# Patient Record
Sex: MALE | Race: BLACK OR AFRICAN AMERICAN | Employment: FULL TIME | URBAN - METROPOLITAN AREA
[De-identification: names, ages, dates, MRNs, and addresses within clinical notes are randomized per-mention and may not be internally consistent; named-entity substitution may affect disease eponyms.]

---

## 2017-02-27 ENCOUNTER — APPOINTMENT (OUTPATIENT)
Dept: OCCUPATIONAL MEDICINE | Age: 27
End: 2017-02-27
Attending: EMERGENCY MEDICINE
Payer: COMMERCIAL

## 2017-03-24 ENCOUNTER — HOSPITAL ENCOUNTER (EMERGENCY)
Facility: HOSPITAL | Age: 27
Discharge: HOME | End: 2017-03-24
Attending: EMERGENCY MEDICINE
Payer: MEDICAID

## 2017-03-24 VITALS
SYSTOLIC BLOOD PRESSURE: 132 MMHG | HEART RATE: 66 BPM | DIASTOLIC BLOOD PRESSURE: 72 MMHG | BODY MASS INDEX: 25.67 KG/M2 | TEMPERATURE: 99 F | WEIGHT: 200 LBS | HEIGHT: 74 IN | OXYGEN SATURATION: 98 % | RESPIRATION RATE: 18 BRPM

## 2017-03-24 DIAGNOSIS — H66.91 RIGHT OTITIS MEDIA, UNSPECIFIED CHRONICITY, UNSPECIFIED OTITIS MEDIA TYPE: Primary | ICD-10-CM

## 2017-03-24 PROCEDURE — 99283 EMERGENCY DEPT VISIT LOW MDM: CPT

## 2017-03-24 RX ORDER — ONDANSETRON 4 MG/1
4 TABLET, ORALLY DISINTEGRATING ORAL EVERY 8 HOURS PRN
Qty: 9 TABLET | Refills: 0 | Status: SHIPPED | OUTPATIENT
Start: 2017-03-24 | End: 2017-03-27

## 2017-03-24 RX ORDER — AMOXICILLIN 875 MG/1
875 TABLET, COATED ORAL 2 TIMES DAILY
Qty: 20 TABLET | Refills: 0 | Status: SHIPPED | OUTPATIENT
Start: 2017-03-24 | End: 2017-04-03

## 2017-03-25 NOTE — ED NOTES
Pt presents with R ear pain beginning yesterday. Pain is \"8/10\". Denies nasal congestion, cough, sore throat. States he has had a fever on and off for past two days. Pt took tylenol last night but nothing since.  Pt has had n/v today since this AM. Denies

## 2017-03-25 NOTE — ED PROVIDER NOTES
Patient Seen in: 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 Right TM intact though erythematous/bulging and dull; right EAC/mastoid unremarkable. Left TM/EAC unremarkable. BL tonsils without exudate/erythema  Head: Normocephalic. Eyes: No injection. Cardiovascular: RRR.    Pulmonary/Chest: Effort n

## 2017-05-12 ENCOUNTER — APPOINTMENT (OUTPATIENT)
Dept: ULTRASOUND IMAGING | Facility: HOSPITAL | Age: 27
End: 2017-05-12
Attending: STUDENT IN AN ORGANIZED HEALTH CARE EDUCATION/TRAINING PROGRAM
Payer: MEDICAID

## 2017-05-12 ENCOUNTER — HOSPITAL ENCOUNTER (EMERGENCY)
Facility: HOSPITAL | Age: 27
Discharge: HOME | End: 2017-05-12
Attending: STUDENT IN AN ORGANIZED HEALTH CARE EDUCATION/TRAINING PROGRAM
Payer: MEDICAID

## 2017-05-12 ENCOUNTER — APPOINTMENT (OUTPATIENT)
Dept: CT IMAGING | Facility: HOSPITAL | Age: 27
End: 2017-05-12
Attending: STUDENT IN AN ORGANIZED HEALTH CARE EDUCATION/TRAINING PROGRAM
Payer: MEDICAID

## 2017-05-12 ENCOUNTER — APPOINTMENT (OUTPATIENT)
Dept: GENERAL RADIOLOGY | Facility: HOSPITAL | Age: 27
End: 2017-05-12
Attending: STUDENT IN AN ORGANIZED HEALTH CARE EDUCATION/TRAINING PROGRAM
Payer: MEDICAID

## 2017-05-12 VITALS
TEMPERATURE: 98 F | OXYGEN SATURATION: 98 % | RESPIRATION RATE: 18 BRPM | WEIGHT: 238 LBS | HEART RATE: 68 BPM | BODY MASS INDEX: 31 KG/M2 | SYSTOLIC BLOOD PRESSURE: 101 MMHG | DIASTOLIC BLOOD PRESSURE: 72 MMHG

## 2017-05-12 DIAGNOSIS — R07.9 ACUTE CHEST PAIN: Primary | ICD-10-CM

## 2017-05-12 DIAGNOSIS — M79.604 RIGHT LEG PAIN: ICD-10-CM

## 2017-05-12 DIAGNOSIS — R11.2 NAUSEA AND VOMITING IN ADULT: ICD-10-CM

## 2017-05-12 PROCEDURE — 99285 EMERGENCY DEPT VISIT HI MDM: CPT

## 2017-05-12 PROCEDURE — 96375 TX/PRO/DX INJ NEW DRUG ADDON: CPT

## 2017-05-12 PROCEDURE — 96361 HYDRATE IV INFUSION ADD-ON: CPT

## 2017-05-12 PROCEDURE — 93005 ELECTROCARDIOGRAM TRACING: CPT

## 2017-05-12 PROCEDURE — 93971 EXTREMITY STUDY: CPT | Performed by: STUDENT IN AN ORGANIZED HEALTH CARE EDUCATION/TRAINING PROGRAM

## 2017-05-12 PROCEDURE — 93010 ELECTROCARDIOGRAM REPORT: CPT

## 2017-05-12 PROCEDURE — 71010 XR CHEST AP PORTABLE  (CPT=71010): CPT | Performed by: STUDENT IN AN ORGANIZED HEALTH CARE EDUCATION/TRAINING PROGRAM

## 2017-05-12 PROCEDURE — 71275 CT ANGIOGRAPHY CHEST: CPT | Performed by: STUDENT IN AN ORGANIZED HEALTH CARE EDUCATION/TRAINING PROGRAM

## 2017-05-12 PROCEDURE — 96374 THER/PROPH/DIAG INJ IV PUSH: CPT

## 2017-05-12 PROCEDURE — 85610 PROTHROMBIN TIME: CPT | Performed by: STUDENT IN AN ORGANIZED HEALTH CARE EDUCATION/TRAINING PROGRAM

## 2017-05-12 PROCEDURE — 85730 THROMBOPLASTIN TIME PARTIAL: CPT | Performed by: STUDENT IN AN ORGANIZED HEALTH CARE EDUCATION/TRAINING PROGRAM

## 2017-05-12 PROCEDURE — 80053 COMPREHEN METABOLIC PANEL: CPT | Performed by: STUDENT IN AN ORGANIZED HEALTH CARE EDUCATION/TRAINING PROGRAM

## 2017-05-12 PROCEDURE — 74177 CT ABD & PELVIS W/CONTRAST: CPT | Performed by: STUDENT IN AN ORGANIZED HEALTH CARE EDUCATION/TRAINING PROGRAM

## 2017-05-12 PROCEDURE — 84484 ASSAY OF TROPONIN QUANT: CPT | Performed by: STUDENT IN AN ORGANIZED HEALTH CARE EDUCATION/TRAINING PROGRAM

## 2017-05-12 PROCEDURE — 85025 COMPLETE CBC W/AUTO DIFF WBC: CPT | Performed by: STUDENT IN AN ORGANIZED HEALTH CARE EDUCATION/TRAINING PROGRAM

## 2017-05-12 RX ORDER — FAMOTIDINE 20 MG/1
20 TABLET ORAL 2 TIMES DAILY PRN
Qty: 30 TABLET | Refills: 0 | Status: SHIPPED | OUTPATIENT
Start: 2017-05-12 | End: 2017-06-11

## 2017-05-12 RX ORDER — ONDANSETRON 2 MG/ML
4 INJECTION INTRAMUSCULAR; INTRAVENOUS ONCE
Status: COMPLETED | OUTPATIENT
Start: 2017-05-12 | End: 2017-05-12

## 2017-05-12 RX ORDER — ONDANSETRON 4 MG/1
4 TABLET, ORALLY DISINTEGRATING ORAL EVERY 8 HOURS PRN
Qty: 10 TABLET | Refills: 0 | Status: SHIPPED | OUTPATIENT
Start: 2017-05-12 | End: 2017-05-19

## 2017-05-12 RX ORDER — HYDROCODONE BITARTRATE AND ACETAMINOPHEN 5; 325 MG/1; MG/1
1-2 TABLET ORAL EVERY 4 HOURS PRN
Qty: 20 TABLET | Refills: 0 | Status: SHIPPED | OUTPATIENT
Start: 2017-05-12 | End: 2017-05-19

## 2017-05-12 RX ORDER — MORPHINE SULFATE 4 MG/ML
4 INJECTION, SOLUTION INTRAMUSCULAR; INTRAVENOUS ONCE
Status: COMPLETED | OUTPATIENT
Start: 2017-05-12 | End: 2017-05-12

## 2017-05-12 NOTE — ED PROVIDER NOTES
Patient Seen in: [facility] Emergency Department    History   Patient presents with:  Chest Pain Angina (cardiovascular)    Stated Complaint: chest pain, right leg pain    HPI    Patient is a 20-year-old male with previous history of sarcoma status 0430 Temporal   SpO2 05/12/17 0430 98 %   O2 Device 05/12/17 0430 None (Room air)       Current:/79 mmHg  Pulse 62  Temp(Src) 98.3 °F (36.8 °C) (Temporal)  Resp 16  Wt 107. 956 kg  SpO2 98%        Physical Exam    Constitutional: oriented to person, p ------                     CBC W/ DIFFERENTIAL[[id number]]          Abnormal            Final result                 Please view results for these tests on the individual orders. SCAN SLIDE      EKG    Rate, intervals and axes as noted on EKG Report.   Rat

## 2017-05-12 NOTE — ED PROVIDER NOTES
Patient updated with the status of his radiology studies. He is very tired as he was up all night and he is sleeping in the emergency department. Abdomen soft and nontender.

## 2017-05-12 NOTE — ED INITIAL ASSESSMENT (HPI)
Pt c/o midsternal chest pain radiating to right arm and down right leg; onset yesterday at noon. Denies n/v/d; reports headache yesterday that has since resolved.

## 2018-07-06 ENCOUNTER — TELEPHONE (OUTPATIENT)
Dept: SURGERY | Facility: CLINIC | Age: 28
End: 2018-07-06

## 2018-07-06 ENCOUNTER — HOSPITAL ENCOUNTER (EMERGENCY)
Facility: HOSPITAL | Age: 28
Discharge: HOME | End: 2018-07-06
Attending: EMERGENCY MEDICINE
Payer: COMMERCIAL

## 2018-07-06 VITALS
HEART RATE: 54 BPM | RESPIRATION RATE: 14 BRPM | SYSTOLIC BLOOD PRESSURE: 114 MMHG | DIASTOLIC BLOOD PRESSURE: 81 MMHG | HEIGHT: 74 IN | OXYGEN SATURATION: 99 % | TEMPERATURE: 98 F | BODY MASS INDEX: 25.67 KG/M2 | WEIGHT: 200 LBS

## 2018-07-06 DIAGNOSIS — G89.29 OTHER CHRONIC PAIN: Primary | ICD-10-CM

## 2018-07-06 LAB
ALBUMIN SERPL-MCNC: 3.8 G/DL (ref 3.5–4.8)
ALP LIVER SERPL-CCNC: 66 U/L (ref 45–117)
ALT SERPL-CCNC: 34 U/L (ref 17–63)
AST SERPL-CCNC: 21 U/L (ref 15–41)
BASOPHILS # BLD AUTO: 0.03 X10(3) UL (ref 0–0.1)
BASOPHILS NFR BLD AUTO: 0.6 %
BILIRUB SERPL-MCNC: 0.6 MG/DL (ref 0.1–2)
BUN BLD-MCNC: 10 MG/DL (ref 8–20)
CALCIUM BLD-MCNC: 9.1 MG/DL (ref 8.3–10.3)
CHLORIDE: 108 MMOL/L (ref 101–111)
CO2: 28 MMOL/L (ref 22–32)
CREAT BLD-MCNC: 0.97 MG/DL (ref 0.7–1.3)
EOSINOPHIL # BLD AUTO: 0.09 X10(3) UL (ref 0–0.3)
EOSINOPHIL NFR BLD AUTO: 1.9 %
ERYTHROCYTE [DISTWIDTH] IN BLOOD BY AUTOMATED COUNT: 12 % (ref 11.5–16)
GLUCOSE BLD-MCNC: 96 MG/DL (ref 70–99)
HCT VFR BLD AUTO: 46.3 % (ref 37–53)
HGB BLD-MCNC: 15.5 G/DL (ref 13–17)
IMMATURE GRANULOCYTE COUNT: 0.01 X10(3) UL (ref 0–1)
IMMATURE GRANULOCYTE RATIO %: 0.2 %
LIPASE: 159 U/L (ref 73–393)
LYMPHOCYTES # BLD AUTO: 1.47 X10(3) UL (ref 0.9–4)
LYMPHOCYTES NFR BLD AUTO: 31.8 %
M PROTEIN MFR SERPL ELPH: 7.3 G/DL (ref 6.1–8.3)
MCH RBC QN AUTO: 29.8 PG (ref 27–33.2)
MCHC RBC AUTO-ENTMCNC: 33.5 G/DL (ref 31–37)
MCV RBC AUTO: 88.9 FL (ref 80–99)
MONOCYTES # BLD AUTO: 0.55 X10(3) UL (ref 0.1–1)
MONOCYTES NFR BLD AUTO: 11.9 %
NEUTROPHIL ABS PRELIM: 2.47 X10 (3) UL (ref 1.3–6.7)
NEUTROPHILS # BLD AUTO: 2.47 X10(3) UL (ref 1.3–6.7)
NEUTROPHILS NFR BLD AUTO: 53.6 %
PLATELET # BLD AUTO: 318 10(3)UL (ref 150–450)
POTASSIUM SERPL-SCNC: 4.3 MMOL/L (ref 3.6–5.1)
RBC # BLD AUTO: 5.21 X10(6)UL (ref 4.3–5.7)
RED CELL DISTRIBUTION WIDTH-SD: 39.4 FL (ref 35.1–46.3)
SODIUM SERPL-SCNC: 140 MMOL/L (ref 136–144)
WBC # BLD AUTO: 4.6 X10(3) UL (ref 4–13)

## 2018-07-06 PROCEDURE — 99284 EMERGENCY DEPT VISIT MOD MDM: CPT

## 2018-07-06 PROCEDURE — 83690 ASSAY OF LIPASE: CPT | Performed by: EMERGENCY MEDICINE

## 2018-07-06 PROCEDURE — 80053 COMPREHEN METABOLIC PANEL: CPT | Performed by: EMERGENCY MEDICINE

## 2018-07-06 PROCEDURE — 96374 THER/PROPH/DIAG INJ IV PUSH: CPT

## 2018-07-06 PROCEDURE — 85025 COMPLETE CBC W/AUTO DIFF WBC: CPT | Performed by: EMERGENCY MEDICINE

## 2018-07-06 RX ORDER — TRAMADOL HYDROCHLORIDE 50 MG/1
50 TABLET ORAL EVERY 6 HOURS PRN
Qty: 20 TABLET | Refills: 0 | Status: SHIPPED | OUTPATIENT
Start: 2018-07-06 | End: 2018-07-13

## 2018-07-06 RX ORDER — DICYCLOMINE HCL 20 MG
20 TABLET ORAL 3 TIMES DAILY
Qty: 20 TABLET | Refills: 0 | Status: SHIPPED | OUTPATIENT
Start: 2018-07-06 | End: 2019-04-19

## 2018-07-06 RX ORDER — KETOROLAC TROMETHAMINE 30 MG/ML
15 INJECTION, SOLUTION INTRAMUSCULAR; INTRAVENOUS ONCE
Status: COMPLETED | OUTPATIENT
Start: 2018-07-06 | End: 2018-07-06

## 2018-07-06 NOTE — ED INITIAL ASSESSMENT (HPI)
RUQ pain since yesterday. Has had this pain intermittent x 2 years since having sarcoma removed and radiation therapy. Has had multiple work ups including CT's without a definitive diagnosis.

## 2018-07-06 NOTE — TELEPHONE ENCOUNTER
Patient called c/o continued right sided pain, chronic cough that has worsened and caused him to vomit yesterday. State he feels a lump on his right side under chest area above stomach, pain radiates from abdomen down his right side.  Patient states that he

## 2018-07-07 NOTE — ED PROVIDER NOTES
Patient Seen in: [facility] Emergency Department    History   Patient presents with:  Abdomen/Flank Pain (GI/)    Stated Complaint: right sided abdominal pain    HPI    Patient is a 58-year-old male comes emergency room for evaluation of right-mila reviewed and negative except as noted above.     Physical Exam   ED Triage Vitals  BP: 133/86 [07/06/18 1407]  Pulse: 68 [07/06/18 1407]  Resp: 18 [07/06/18 1407]  Temp: 98.2 °F (36.8 °C) [07/06/18 1407]  Temp src: Temporal [07/06/18 1407]  SpO2: 98 % [07/0 RAINBOW DRAW LAVENDER   RAINBOW DRAW LIGHT GREEN   RAINBOW DRAW GOLD   CBC W/ DIFFERENTIAL       ED Course as of Jul 06 2316  ------------------------------------------------------------      MDM     Patient is a 58-year-old male comes emergency room for for Pain., Print Script, Disp-20 tablet, R-0    Dicyclomine HCl 20 MG Oral Tab  Take 1 tablet (20 mg total) by mouth 3 (three) times daily. , Print Script, Disp-20 tablet, R-0

## 2018-07-09 ENCOUNTER — TELEPHONE (OUTPATIENT)
Dept: SURGERY | Facility: CLINIC | Age: 28
End: 2018-07-09

## 2018-07-09 DIAGNOSIS — C47.9 MALIGNANT PERIPHERAL NERVE SHEATH TUMOR (HCC): Primary | ICD-10-CM

## 2018-07-09 NOTE — TELEPHONE ENCOUNTER
Informed patient that Dr. Robbin Farah would like patient to obtain new imaging and then follow up with an OV. Orders for CAP ct entered in EPIC.

## 2018-07-30 ENCOUNTER — TELEPHONE (OUTPATIENT)
Dept: SURGERY | Facility: CLINIC | Age: 28
End: 2018-07-30

## 2018-07-30 NOTE — TELEPHONE ENCOUNTER
Multiple attempts to reach patient. Appointment for 8/1/18 cancelled as patient did not complete imaging as ordered. Callback number left for patient to reschedule after imaging complete.

## 2019-01-04 ENCOUNTER — HOSPITAL ENCOUNTER (EMERGENCY)
Age: 29
Discharge: HOME | End: 2019-01-04
Attending: EMERGENCY MEDICINE
Payer: COMMERCIAL

## 2019-01-04 ENCOUNTER — APPOINTMENT (OUTPATIENT)
Dept: CT IMAGING | Age: 29
End: 2019-01-04
Attending: EMERGENCY MEDICINE
Payer: COMMERCIAL

## 2019-01-04 VITALS
RESPIRATION RATE: 16 BRPM | HEART RATE: 84 BPM | OXYGEN SATURATION: 99 % | SYSTOLIC BLOOD PRESSURE: 123 MMHG | TEMPERATURE: 99 F | HEIGHT: 74 IN | DIASTOLIC BLOOD PRESSURE: 78 MMHG | WEIGHT: 260 LBS | BODY MASS INDEX: 33.37 KG/M2

## 2019-01-04 DIAGNOSIS — R11.2 NON-INTRACTABLE VOMITING WITH NAUSEA, UNSPECIFIED VOMITING TYPE: ICD-10-CM

## 2019-01-04 DIAGNOSIS — N10 ACUTE PYELONEPHRITIS: Primary | ICD-10-CM

## 2019-01-04 LAB
ALBUMIN SERPL-MCNC: 3.6 G/DL (ref 3.1–4.5)
ALBUMIN/GLOB SERPL: 1.1 {RATIO} (ref 1–2)
ALP LIVER SERPL-CCNC: 72 U/L (ref 45–117)
ALT SERPL-CCNC: 43 U/L (ref 17–63)
ANION GAP SERPL CALC-SCNC: 7 MMOL/L (ref 0–18)
AST SERPL-CCNC: 25 U/L (ref 15–41)
BASOPHILS # BLD AUTO: 0.01 X10(3) UL (ref 0–0.1)
BASOPHILS NFR BLD AUTO: 0.1 %
BILIRUB SERPL-MCNC: 0.8 MG/DL (ref 0.1–2)
BUN BLD-MCNC: 9 MG/DL (ref 8–20)
BUN/CREAT SERPL: 9.7 (ref 10–20)
CALCIUM BLD-MCNC: 8.6 MG/DL (ref 8.3–10.3)
CHLORIDE SERPL-SCNC: 104 MMOL/L (ref 101–111)
CO2 SERPL-SCNC: 27 MMOL/L (ref 22–32)
COLOR UR AUTO: YELLOW
CREAT BLD-MCNC: 0.93 MG/DL (ref 0.7–1.3)
EOSINOPHIL # BLD AUTO: 0 X10(3) UL (ref 0–0.3)
EOSINOPHIL NFR BLD AUTO: 0 %
ERYTHROCYTE [DISTWIDTH] IN BLOOD BY AUTOMATED COUNT: 12.1 % (ref 11.5–16)
GLOBULIN PLAS-MCNC: 3.4 G/DL (ref 2.8–4.4)
GLUCOSE BLD-MCNC: 97 MG/DL (ref 70–99)
GLUCOSE UR STRIP.AUTO-MCNC: NEGATIVE MG/DL
HCT VFR BLD AUTO: 46 % (ref 37–53)
HGB BLD-MCNC: 15.2 G/DL (ref 13–17)
IMMATURE GRANULOCYTE COUNT: 0.03 X10(3) UL (ref 0–1)
IMMATURE GRANULOCYTE RATIO %: 0.4 %
LIPASE: 98 U/L (ref 73–393)
LYMPHOCYTES # BLD AUTO: 0.51 X10(3) UL (ref 0.9–4)
LYMPHOCYTES NFR BLD AUTO: 6.7 %
M PROTEIN MFR SERPL ELPH: 7 G/DL (ref 6.4–8.2)
MCH RBC QN AUTO: 29.4 PG (ref 27–33.2)
MCHC RBC AUTO-ENTMCNC: 33 G/DL (ref 31–37)
MCV RBC AUTO: 89 FL (ref 80–99)
MONOCYTES # BLD AUTO: 0.92 X10(3) UL (ref 0.1–1)
MONOCYTES NFR BLD AUTO: 12 %
NEUTROPHIL ABS PRELIM: 6.17 X10 (3) UL (ref 1.3–6.7)
NEUTROPHILS # BLD AUTO: 6.17 X10(3) UL (ref 1.3–6.7)
NEUTROPHILS NFR BLD AUTO: 80.8 %
NITRITE UR QL STRIP.AUTO: NEGATIVE
OSMOLALITY SERPL CALC.SUM OF ELEC: 285 MOSM/KG (ref 275–295)
PH UR STRIP.AUTO: 6 [PH] (ref 4.5–8)
PLATELET # BLD AUTO: 268 10(3)UL (ref 150–450)
POTASSIUM SERPL-SCNC: 3.6 MMOL/L (ref 3.6–5.1)
RBC # BLD AUTO: 5.17 X10(6)UL (ref 4.3–5.7)
RED CELL DISTRIBUTION WIDTH-SD: 39.7 FL (ref 35.1–46.3)
SODIUM SERPL-SCNC: 138 MMOL/L (ref 136–144)
SP GR UR STRIP.AUTO: 1.02 (ref 1–1.03)
UROBILINOGEN UR STRIP.AUTO-MCNC: 1 MG/DL
WBC # BLD AUTO: 7.6 X10(3) UL (ref 4–13)

## 2019-01-04 PROCEDURE — 85025 COMPLETE CBC W/AUTO DIFF WBC: CPT | Performed by: EMERGENCY MEDICINE

## 2019-01-04 PROCEDURE — 74177 CT ABD & PELVIS W/CONTRAST: CPT | Performed by: EMERGENCY MEDICINE

## 2019-01-04 PROCEDURE — 81015 MICROSCOPIC EXAM OF URINE: CPT | Performed by: EMERGENCY MEDICINE

## 2019-01-04 PROCEDURE — 87086 URINE CULTURE/COLONY COUNT: CPT | Performed by: EMERGENCY MEDICINE

## 2019-01-04 PROCEDURE — 81001 URINALYSIS AUTO W/SCOPE: CPT | Performed by: EMERGENCY MEDICINE

## 2019-01-04 PROCEDURE — 83690 ASSAY OF LIPASE: CPT | Performed by: EMERGENCY MEDICINE

## 2019-01-04 PROCEDURE — 99284 EMERGENCY DEPT VISIT MOD MDM: CPT

## 2019-01-04 PROCEDURE — 80053 COMPREHEN METABOLIC PANEL: CPT | Performed by: EMERGENCY MEDICINE

## 2019-01-04 PROCEDURE — 96361 HYDRATE IV INFUSION ADD-ON: CPT

## 2019-01-04 PROCEDURE — 96374 THER/PROPH/DIAG INJ IV PUSH: CPT

## 2019-01-04 RX ORDER — ONDANSETRON 4 MG/1
4 TABLET, ORALLY DISINTEGRATING ORAL EVERY 4 HOURS PRN
Qty: 10 TABLET | Refills: 0 | Status: SHIPPED | OUTPATIENT
Start: 2019-01-04 | End: 2019-01-11

## 2019-01-04 RX ORDER — ONDANSETRON 2 MG/ML
4 INJECTION INTRAMUSCULAR; INTRAVENOUS ONCE
Status: COMPLETED | OUTPATIENT
Start: 2019-01-04 | End: 2019-01-04

## 2019-01-04 RX ORDER — CEPHALEXIN 500 MG/1
500 CAPSULE ORAL 4 TIMES DAILY
Qty: 40 CAPSULE | Refills: 0 | Status: SHIPPED | OUTPATIENT
Start: 2019-01-04 | End: 2019-01-14

## 2019-01-05 NOTE — ED PROVIDER NOTES
Patient Seen in: [facility] In [place]    History   Patient presents with:  Abdomen/Flank Pain (GI/)    Stated Complaint: abd pain, swelling right side, chills    HPI    25-year-old male who presents to the emergency department comp Triage Vitals [01/04/19 1948]   /78   Pulse 96   Resp 18   Temp 100 °F (37.8 °C)   Temp src Temporal   SpO2 99 %   O2 Device None (Room air)       Current:/78   Pulse 96   Temp 100 °F (37.8 °C) (Temporal)   Resp 18   Ht 188 cm (6' 2\")   Wt 117 REFLEX CULTURE - Abnormal; Notable for the following components:    WBC Urine 11-20 (*)     RBC URINE 3-5 (*)     Squamous Epi.  Cells Moderate (*)     Amorphous Crystals Occasional (*)     Mucous Urine 4+ (*)     All other components within normal limits next 10 days. Written for Zofran for nausea control. Told to continue to push fluids as an outpatient.   Return immediately if he has any worsening symptoms such as increasing abdominal pain, worsening fevers, intractable vomiting, or any other untoward s

## 2019-01-05 NOTE — ED INITIAL ASSESSMENT (HPI)
Pt to the ED for evaluation of one week history of RLQ abdominal pain, chills and fever. PT states the pain became constant three days ago. Also reports nausea, vomiting and three BM's today that are getting progressively looser.

## 2019-03-13 ENCOUNTER — HOSPITAL ENCOUNTER (EMERGENCY)
Age: 29
Discharge: HOME | End: 2019-03-13
Attending: EMERGENCY MEDICINE
Payer: MEDICAID

## 2019-03-13 VITALS
RESPIRATION RATE: 18 BRPM | HEIGHT: 75 IN | BODY MASS INDEX: 27.35 KG/M2 | DIASTOLIC BLOOD PRESSURE: 81 MMHG | HEART RATE: 109 BPM | SYSTOLIC BLOOD PRESSURE: 124 MMHG | WEIGHT: 220 LBS | TEMPERATURE: 98 F | OXYGEN SATURATION: 96 %

## 2019-03-13 DIAGNOSIS — S39.011A FLANK STRAIN, INITIAL ENCOUNTER: Primary | ICD-10-CM

## 2019-03-13 LAB
BILIRUB UR QL STRIP.AUTO: NEGATIVE
CLARITY UR REFRACT.AUTO: CLEAR
COLOR UR AUTO: YELLOW
GLUCOSE UR STRIP.AUTO-MCNC: NEGATIVE MG/DL
KETONES UR STRIP.AUTO-MCNC: NEGATIVE MG/DL
NITRITE UR QL STRIP.AUTO: NEGATIVE
PH UR STRIP.AUTO: 7.5 [PH] (ref 4.5–8)
PROT UR STRIP.AUTO-MCNC: NEGATIVE MG/DL
SP GR UR STRIP.AUTO: 1.02 (ref 1–1.03)
UROBILINOGEN UR STRIP.AUTO-MCNC: 0.2 MG/DL

## 2019-03-13 PROCEDURE — 81001 URINALYSIS AUTO W/SCOPE: CPT | Performed by: EMERGENCY MEDICINE

## 2019-03-13 PROCEDURE — 87086 URINE CULTURE/COLONY COUNT: CPT | Performed by: EMERGENCY MEDICINE

## 2019-03-13 PROCEDURE — 99283 EMERGENCY DEPT VISIT LOW MDM: CPT

## 2019-03-13 RX ORDER — IBUPROFEN 600 MG/1
600 TABLET ORAL ONCE
Status: COMPLETED | OUTPATIENT
Start: 2019-03-13 | End: 2019-03-13

## 2019-03-13 NOTE — ED INITIAL ASSESSMENT (HPI)
PT to the ED for evaluation of L flank pain that began yesterday after bending over to pick something up. Pt denies urinary symptoms.

## 2019-03-13 NOTE — ED PROVIDER NOTES
Patient Seen in: [facility] Emergency Department In [place]    History   Patient presents with:  Back Pain (musculoskeletal)    Stated Complaint: L flank pain since yesterday after bending over    HPI    This is a 33-year-old male who presents with left-s HPI.  Constitutional and vital signs reviewed. All other systems reviewed and negative except as noted above.     Physical Exam     ED Triage Vitals [03/13/19 0330]   /81   Pulse 109   Resp 18   Temp 97.9 °F (36.6 °C)   Temp src Temporal   SpO2 9 diagnosis)    Disposition:  Discharge  3/13/2019  4:17 am    Follow-up:  [address] [address]  [address] [facility]  [phone]    In 1 week  sooner, If symptoms worsen        Medications Prescribed:  Current Discharg

## 2019-04-19 ENCOUNTER — HOSPITAL ENCOUNTER (EMERGENCY)
Age: 29
Discharge: HOME | End: 2019-04-19
Attending: EMERGENCY MEDICINE
Payer: MEDICAID

## 2019-04-19 ENCOUNTER — APPOINTMENT (OUTPATIENT)
Dept: GENERAL RADIOLOGY | Age: 29
End: 2019-04-19
Attending: EMERGENCY MEDICINE
Payer: MEDICAID

## 2019-04-19 VITALS
BODY MASS INDEX: 25.67 KG/M2 | SYSTOLIC BLOOD PRESSURE: 118 MMHG | HEART RATE: 84 BPM | TEMPERATURE: 98 F | RESPIRATION RATE: 16 BRPM | DIASTOLIC BLOOD PRESSURE: 75 MMHG | WEIGHT: 200 LBS | HEIGHT: 74 IN | OXYGEN SATURATION: 100 %

## 2019-04-19 DIAGNOSIS — M25.512 ACUTE PAIN OF LEFT SHOULDER: Primary | ICD-10-CM

## 2019-04-19 PROCEDURE — 73030 X-RAY EXAM OF SHOULDER: CPT | Performed by: EMERGENCY MEDICINE

## 2019-04-19 PROCEDURE — 99283 EMERGENCY DEPT VISIT LOW MDM: CPT | Performed by: EMERGENCY MEDICINE

## 2019-04-19 RX ORDER — CYCLOBENZAPRINE HCL 10 MG
10 TABLET ORAL 3 TIMES DAILY PRN
Qty: 20 TABLET | Refills: 0 | Status: SHIPPED | OUTPATIENT
Start: 2019-04-19 | End: 2019-04-26

## 2019-04-19 RX ORDER — PREDNISONE 20 MG/1
60 TABLET ORAL DAILY
Qty: 15 TABLET | Refills: 0 | Status: SHIPPED | OUTPATIENT
Start: 2019-04-19 | End: 2019-04-24

## 2019-04-19 NOTE — ED PROVIDER NOTES
Patient Seen in: [facility] In [place]    History   Patient presents with:  Upper Extremity Injury (musculoskeletal)    Stated Complaint: SHOULDER SWELLING FOR PAST 2 DAYS.   DENIES INJURY    HPI    Patient is a 27-year-old male who pr Pulse 84   Temp 98.4 °F (36.9 °C) (Oral)   Resp 16   Ht 188 cm (6' 2\")   Wt 90.7 kg   SpO2 100%   BMI 25.68 kg/m²         Physical Exam   Constitutional: He is oriented to person, place, and time. He appears well-developed and well-nourished.    HENT:   He (primary encounter diagnosis)    Disposition:  Discharge  4/19/2019  6:39 pm    Follow-up:  [facility] [address]  [phone]              Medications Prescribed:  Current Discharge Medication Constanza Warner

## 2019-08-26 ENCOUNTER — HOSPITAL ENCOUNTER (EMERGENCY)
Facility: HOSPITAL | Age: 29
Discharge: HOME | End: 2019-08-26
Attending: PEDIATRICS
Payer: MEDICAID

## 2019-08-26 VITALS
RESPIRATION RATE: 16 BRPM | OXYGEN SATURATION: 97 % | WEIGHT: 220 LBS | SYSTOLIC BLOOD PRESSURE: 133 MMHG | TEMPERATURE: 98 F | HEIGHT: 74 IN | BODY MASS INDEX: 28.23 KG/M2 | HEART RATE: 83 BPM | DIASTOLIC BLOOD PRESSURE: 89 MMHG

## 2019-08-26 DIAGNOSIS — J01.10 ACUTE FRONTAL SINUSITIS, RECURRENCE NOT SPECIFIED: Primary | ICD-10-CM

## 2019-08-26 PROCEDURE — 99283 EMERGENCY DEPT VISIT LOW MDM: CPT

## 2019-08-26 RX ORDER — AZITHROMYCIN 250 MG/1
TABLET, FILM COATED ORAL
Qty: 1 PACKAGE | Refills: 0 | Status: SHIPPED | OUTPATIENT
Start: 2019-08-26 | End: 2019-08-31

## 2019-08-27 NOTE — ED PROVIDER NOTES
Patient Seen in: [facility] Emergency Department    History   Patient presents with:  Cough/URI    Stated Complaint: runny nose    HPI    Patient is a 26-year-old male here complaining of runny nose frontal headache symptoms pressure in its anterior normal,from. ED Course   Labs Reviewed - No data to display       Patient appears nontoxic. Exam is consistent with likely early sinus infection. He will be treated with Zithromax and OTC medicines.   He will follow with the PMD and return for worsenin

## 2019-08-27 NOTE — ED INITIAL ASSESSMENT (HPI)
Patient here with runny nose, frontal headache and pressure that started yesterday and got worse today.

## 2019-10-25 ENCOUNTER — HOSPITAL ENCOUNTER (EMERGENCY)
Age: 29
Discharge: HOME | End: 2019-10-25
Attending: EMERGENCY MEDICINE
Payer: MEDICAID

## 2019-10-25 VITALS
OXYGEN SATURATION: 98 % | HEART RATE: 88 BPM | SYSTOLIC BLOOD PRESSURE: 118 MMHG | RESPIRATION RATE: 16 BRPM | DIASTOLIC BLOOD PRESSURE: 74 MMHG | TEMPERATURE: 98 F

## 2019-10-25 DIAGNOSIS — L84 CORN OR CALLUS: Primary | ICD-10-CM

## 2019-10-25 PROCEDURE — 99282 EMERGENCY DEPT VISIT SF MDM: CPT

## 2019-10-25 PROCEDURE — 82962 GLUCOSE BLOOD TEST: CPT

## 2019-10-25 NOTE — ED PROVIDER NOTES
Patient Seen in: THE [facility] Emergency Department In [place]      History   Patient presents with:  Lower Extremity Injury (musculoskeletal)    Stated Complaint: PATIENT STATES HE HAS A HOLE ON THE BOTTOM OF HIS LEFT FOOT.     HPI    Is a 64-year-old male w (36.9 °C)   Temp src Temporal   SpO2 95 %   O2 Device None (Room air)       Current:BP (!) 146/92   Pulse 77   Temp 98.4 °F (36.9 °C) (Temporal)   Resp 18   SpO2 95%         Physical Exam    Extremity: Dime sized lesion on the plantar aspect of the mid lef

## 2020-03-14 ENCOUNTER — HOSPITAL ENCOUNTER (EMERGENCY)
Age: 30
Discharge: HOME | End: 2020-03-14
Attending: EMERGENCY MEDICINE
Payer: COMMERCIAL

## 2020-03-14 VITALS
WEIGHT: 270 LBS | RESPIRATION RATE: 18 BRPM | HEART RATE: 86 BPM | HEIGHT: 74 IN | SYSTOLIC BLOOD PRESSURE: 142 MMHG | DIASTOLIC BLOOD PRESSURE: 96 MMHG | BODY MASS INDEX: 34.65 KG/M2 | TEMPERATURE: 98 F | OXYGEN SATURATION: 98 %

## 2020-03-14 DIAGNOSIS — J06.9 VIRAL URI: Primary | ICD-10-CM

## 2020-03-14 DIAGNOSIS — R19.7 DIARRHEA, UNSPECIFIED TYPE: ICD-10-CM

## 2020-03-14 PROCEDURE — 99282 EMERGENCY DEPT VISIT SF MDM: CPT

## 2020-03-14 RX ORDER — DIPHENOXYLATE HYDROCHLORIDE AND ATROPINE SULFATE 2.5; .025 MG/1; MG/1
1 TABLET ORAL ONCE
Status: COMPLETED | OUTPATIENT
Start: 2020-03-14 | End: 2020-03-14

## 2020-03-14 NOTE — ED INITIAL ASSESSMENT (HPI)
Sore throat, headache, chills, and diarrhea, all sxs x 2 days. No one else at home is sick. No recent travel.

## 2020-03-14 NOTE — ED PROVIDER NOTES
Patient Seen in: [facility] In [place]      History   Patient presents with:  Cough/URI    Stated Complaint: SORE THROAT, CHILLS, DIARRHEA, SINUS CONGESTION    HPI    This is a very pleasant 53-year-old male with history of sarcoma th above.    Physical Exam     ED Triage Vitals [03/14/20 0236]   BP (!) 142/96   Pulse 86   Resp 18   Temp 97.6 °F (36.4 °C)   Temp src    SpO2 98 %   O2 Device None (Room air)       Current:BP (!) 142/96   Pulse 86   Temp 97.6 °F (36.4 °C)   Resp 18   Ht 18

## 2020-03-29 ENCOUNTER — HOSPITAL ENCOUNTER (EMERGENCY)
Age: 30
Discharge: HOME | End: 2020-03-29
Attending: EMERGENCY MEDICINE
Payer: COMMERCIAL

## 2020-03-29 ENCOUNTER — APPOINTMENT (OUTPATIENT)
Dept: CT IMAGING | Age: 30
End: 2020-03-29
Attending: EMERGENCY MEDICINE
Payer: COMMERCIAL

## 2020-03-29 VITALS
TEMPERATURE: 97 F | HEART RATE: 66 BPM | RESPIRATION RATE: 16 BRPM | BODY MASS INDEX: 37.22 KG/M2 | SYSTOLIC BLOOD PRESSURE: 117 MMHG | OXYGEN SATURATION: 98 % | DIASTOLIC BLOOD PRESSURE: 84 MMHG | WEIGHT: 290 LBS | HEIGHT: 74 IN

## 2020-03-29 DIAGNOSIS — M54.41 LOW BACK PAIN WITH BILATERAL SCIATICA, UNSPECIFIED BACK PAIN LATERALITY, UNSPECIFIED CHRONICITY: Primary | ICD-10-CM

## 2020-03-29 DIAGNOSIS — M54.42 LOW BACK PAIN WITH BILATERAL SCIATICA, UNSPECIFIED BACK PAIN LATERALITY, UNSPECIFIED CHRONICITY: Primary | ICD-10-CM

## 2020-03-29 LAB
ALBUMIN SERPL-MCNC: 3.6 G/DL (ref 3.4–5)
ALBUMIN/GLOB SERPL: 1 {RATIO} (ref 1–2)
ALP LIVER SERPL-CCNC: 78 U/L (ref 45–117)
ALT SERPL-CCNC: 94 U/L (ref 16–61)
ANION GAP SERPL CALC-SCNC: 6 MMOL/L (ref 0–18)
AST SERPL-CCNC: 37 U/L (ref 15–37)
BASOPHILS # BLD AUTO: 0.06 X10(3) UL (ref 0–0.2)
BASOPHILS NFR BLD AUTO: 1 %
BILIRUB SERPL-MCNC: 0.4 MG/DL (ref 0.1–2)
BUN BLD-MCNC: 8 MG/DL (ref 7–18)
BUN/CREAT SERPL: 7.4 (ref 10–20)
CALCIUM BLD-MCNC: 9.3 MG/DL (ref 8.5–10.1)
CHLORIDE SERPL-SCNC: 104 MMOL/L (ref 98–112)
CLARITY UR REFRACT.AUTO: CLEAR
CO2 SERPL-SCNC: 28 MMOL/L (ref 21–32)
COLOR UR AUTO: YELLOW
CREAT BLD-MCNC: 1.08 MG/DL (ref 0.7–1.3)
DEPRECATED RDW RBC AUTO: 40.1 FL (ref 35.1–46.3)
EOSINOPHIL # BLD AUTO: 0.27 X10(3) UL (ref 0–0.7)
EOSINOPHIL NFR BLD AUTO: 4.6 %
ERYTHROCYTE [DISTWIDTH] IN BLOOD BY AUTOMATED COUNT: 12.2 % (ref 11–15)
GLOBULIN PLAS-MCNC: 3.5 G/DL (ref 2.8–4.4)
GLUCOSE BLD-MCNC: 102 MG/DL (ref 70–99)
GLUCOSE UR STRIP.AUTO-MCNC: NEGATIVE MG/DL
HCT VFR BLD AUTO: 47.4 % (ref 39–53)
HGB BLD-MCNC: 15.3 G/DL (ref 13–17.5)
IMM GRANULOCYTES # BLD AUTO: 0.02 X10(3) UL (ref 0–1)
IMM GRANULOCYTES NFR BLD: 0.3 %
LEUKOCYTE ESTERASE UR QL STRIP.AUTO: NEGATIVE
LIPASE SERPL-CCNC: 132 U/L (ref 73–393)
LYMPHOCYTES # BLD AUTO: 2.04 X10(3) UL (ref 1–4)
LYMPHOCYTES NFR BLD AUTO: 34.7 %
M PROTEIN MFR SERPL ELPH: 7.1 G/DL (ref 6.4–8.2)
MCH RBC QN AUTO: 29.1 PG (ref 26–34)
MCHC RBC AUTO-ENTMCNC: 32.3 G/DL (ref 31–37)
MCV RBC AUTO: 90.1 FL (ref 80–100)
MONOCYTES # BLD AUTO: 0.75 X10(3) UL (ref 0.1–1)
MONOCYTES NFR BLD AUTO: 12.8 %
NEUTROPHILS # BLD AUTO: 2.74 X10 (3) UL (ref 1.5–7.7)
NEUTROPHILS # BLD AUTO: 2.74 X10(3) UL (ref 1.5–7.7)
NEUTROPHILS NFR BLD AUTO: 46.6 %
NITRITE UR QL STRIP.AUTO: NEGATIVE
OSMOLALITY SERPL CALC.SUM OF ELEC: 285 MOSM/KG (ref 275–295)
PH UR STRIP.AUTO: 5.5 [PH] (ref 4.5–8)
PLATELET # BLD AUTO: 311 10(3)UL (ref 150–450)
POTASSIUM SERPL-SCNC: 4.2 MMOL/L (ref 3.5–5.1)
PROT UR STRIP.AUTO-MCNC: NEGATIVE MG/DL
RBC # BLD AUTO: 5.26 X10(6)UL (ref 4.3–5.7)
RBC UR QL AUTO: NEGATIVE
SODIUM SERPL-SCNC: 138 MMOL/L (ref 136–145)
SP GR UR STRIP.AUTO: >=1.03 (ref 1–1.03)
UROBILINOGEN UR STRIP.AUTO-MCNC: 0.2 MG/DL
WBC # BLD AUTO: 5.9 X10(3) UL (ref 4–11)

## 2020-03-29 PROCEDURE — 85025 COMPLETE CBC W/AUTO DIFF WBC: CPT | Performed by: EMERGENCY MEDICINE

## 2020-03-29 PROCEDURE — 36415 COLL VENOUS BLD VENIPUNCTURE: CPT

## 2020-03-29 PROCEDURE — 83690 ASSAY OF LIPASE: CPT | Performed by: EMERGENCY MEDICINE

## 2020-03-29 PROCEDURE — 81003 URINALYSIS AUTO W/O SCOPE: CPT | Performed by: EMERGENCY MEDICINE

## 2020-03-29 PROCEDURE — 99284 EMERGENCY DEPT VISIT MOD MDM: CPT

## 2020-03-29 PROCEDURE — 80053 COMPREHEN METABOLIC PANEL: CPT | Performed by: EMERGENCY MEDICINE

## 2020-03-29 PROCEDURE — 74177 CT ABD & PELVIS W/CONTRAST: CPT | Performed by: EMERGENCY MEDICINE

## 2020-03-29 PROCEDURE — 81003 URINALYSIS AUTO W/O SCOPE: CPT

## 2020-03-29 RX ORDER — CYCLOBENZAPRINE HCL 10 MG
10 TABLET ORAL 3 TIMES DAILY PRN
Qty: 20 TABLET | Refills: 0 | Status: SHIPPED | OUTPATIENT
Start: 2020-03-29 | End: 2020-04-05

## 2020-03-29 RX ORDER — HYDROCODONE BITARTRATE AND ACETAMINOPHEN 5; 325 MG/1; MG/1
2 TABLET ORAL ONCE
Status: COMPLETED | OUTPATIENT
Start: 2020-03-29 | End: 2020-03-29

## 2020-03-29 RX ORDER — HYDROCODONE BITARTRATE AND ACETAMINOPHEN 5; 325 MG/1; MG/1
1-2 TABLET ORAL EVERY 6 HOURS PRN
Qty: 12 TABLET | Refills: 0 | Status: SHIPPED | OUTPATIENT
Start: 2020-03-29 | End: 2020-04-05

## 2020-03-29 NOTE — ED INITIAL ASSESSMENT (HPI)
C/o lower back pain radiates right leg x 3 days. Pain worse on movement. Work with [employer] 8-10 hrs a day. No cough/fever. No nausea/vomiting/diarrhea. Also c/o burning on urination.

## 2020-03-29 NOTE — ED PROVIDER NOTES
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    General: This is a well-appearing young man sitting in bed in Abdomen+pelvis(contrast Only)(cpt=74177)    Result Date: 3/29/2020  PROCEDURE:  CT ABDOMEN+PELVIS (CONTRAST ONLY) (CPT=74177)  COMPARISON:  [facility], CT, CT ABDOMEN+PELVIS(CONTRAST ONLY)(CPT=74177), 1/04/2019, 9:00 PM.  INDICATIONS:  Burning on urination identified.    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

## 2020-05-16 ENCOUNTER — HOSPITAL ENCOUNTER (EMERGENCY)
Age: 30
Discharge: HOME | End: 2020-05-16
Attending: EMERGENCY MEDICINE
Payer: COMMERCIAL

## 2020-05-16 ENCOUNTER — APPOINTMENT (OUTPATIENT)
Dept: MRI IMAGING | Age: 30
End: 2020-05-16
Attending: EMERGENCY MEDICINE
Payer: COMMERCIAL

## 2020-05-16 VITALS
RESPIRATION RATE: 18 BRPM | TEMPERATURE: 98 F | OXYGEN SATURATION: 98 % | BODY MASS INDEX: 35 KG/M2 | SYSTOLIC BLOOD PRESSURE: 135 MMHG | HEART RATE: 71 BPM | DIASTOLIC BLOOD PRESSURE: 87 MMHG | WEIGHT: 270 LBS

## 2020-05-16 DIAGNOSIS — M51.37 DEGENERATIVE DISC DISEASE AT L5-S1 LEVEL: ICD-10-CM

## 2020-05-16 DIAGNOSIS — M54.41 RIGHT-SIDED LOW BACK PAIN WITH RIGHT-SIDED SCIATICA, UNSPECIFIED CHRONICITY: Primary | ICD-10-CM

## 2020-05-16 PROCEDURE — 72148 MRI LUMBAR SPINE W/O DYE: CPT | Performed by: EMERGENCY MEDICINE

## 2020-05-16 PROCEDURE — 99284 EMERGENCY DEPT VISIT MOD MDM: CPT

## 2020-05-16 RX ORDER — CYCLOBENZAPRINE HCL 10 MG
10 TABLET ORAL 3 TIMES DAILY PRN
Qty: 20 TABLET | Refills: 0 | Status: SHIPPED | OUTPATIENT
Start: 2020-05-16 | End: 2020-05-16

## 2020-05-16 RX ORDER — CYCLOBENZAPRINE HCL 10 MG
10 TABLET ORAL 3 TIMES DAILY PRN
Qty: 20 TABLET | Refills: 0 | Status: SHIPPED | OUTPATIENT
Start: 2020-05-16 | End: 2020-05-23

## 2020-05-16 RX ORDER — MELOXICAM 7.5 MG/1
7.5 TABLET ORAL 2 TIMES DAILY
Qty: 60 TABLET | Refills: 0 | Status: SHIPPED | OUTPATIENT
Start: 2020-05-16 | End: 2020-05-16

## 2020-05-16 RX ORDER — MELOXICAM 7.5 MG/1
7.5 TABLET ORAL 2 TIMES DAILY
Qty: 60 TABLET | Refills: 0 | Status: SHIPPED | OUTPATIENT
Start: 2020-05-16 | End: 2021-05-10

## 2020-05-16 NOTE — ED PROVIDER NOTES
Patient Seen in: THE [facility] Emergency Department In [place]      History   Patient presents with:  Back Pain    Stated Complaint: low back pain 2 days    HPI    41-year-old -American gentleman presents to the emergency department.   He has a history omentum.     • SKIN TISSUE PROCEDURE UNLISTED                      Social History    Tobacco Use      Smoking status: Current Some Day Smoker        Packs/day: 0.50        Types: Cigarettes      Smokeless tobacco: Never Used      Tobacco comment: quit Feb 2 Tenderness: There is no tenderness. There is no guarding or rebound. Musculoskeletal: Normal range of motion. General: No tenderness or deformity. Lymphadenopathy:      Cervical: No cervical adenopathy.    Skin:     General: Skin is warm an BONY STRUCTURES:  No fracture, pars defect, significant scoliosis, or     osseous lesion. CORD/CAUDA EQUINA:  Normal caliber, contour, and signal intensity. CONCLUSION:      1.  Degenerative disc disease at L5-S1 with mild disc height lo helpful.         Disposition and Plan     Clinical Impression:  Right-sided low back pain with right-sided sciatica, unspecified chronicity  (primary encounter diagnosis)  Degenerative disc disease at L5-S1 level    Disposition:  Discharge  5/16/2020  7:36

## 2020-05-18 ENCOUNTER — HOSPITAL ENCOUNTER (EMERGENCY)
Facility: HOSPITAL | Age: 30
Discharge: HOME | End: 2020-05-18
Attending: EMERGENCY MEDICINE
Payer: COMMERCIAL

## 2020-05-18 VITALS
SYSTOLIC BLOOD PRESSURE: 137 MMHG | TEMPERATURE: 98 F | DIASTOLIC BLOOD PRESSURE: 99 MMHG | HEART RATE: 96 BPM | BODY MASS INDEX: 33.57 KG/M2 | WEIGHT: 270 LBS | HEIGHT: 75 IN | RESPIRATION RATE: 18 BRPM | OXYGEN SATURATION: 97 %

## 2020-05-18 DIAGNOSIS — M54.59 INTRACTABLE LOW BACK PAIN: Primary | ICD-10-CM

## 2020-05-18 DIAGNOSIS — M54.40 BACK PAIN OF LUMBAR REGION WITH SCIATICA: ICD-10-CM

## 2020-05-18 DIAGNOSIS — M51.37 DEGENERATIVE DISC DISEASE AT L5-S1 LEVEL: ICD-10-CM

## 2020-05-18 PROCEDURE — 99283 EMERGENCY DEPT VISIT LOW MDM: CPT

## 2020-05-18 RX ORDER — TRAMADOL HYDROCHLORIDE 50 MG/1
TABLET ORAL EVERY 6 HOURS PRN
Qty: 10 TABLET | Refills: 0 | Status: SHIPPED | OUTPATIENT
Start: 2020-05-18 | End: 2020-05-25

## 2020-05-18 RX ORDER — DEXAMETHASONE SODIUM PHOSPHATE 4 MG/ML
10 INJECTION, SOLUTION INTRA-ARTICULAR; INTRALESIONAL; INTRAMUSCULAR; INTRAVENOUS; SOFT TISSUE ONCE
Status: COMPLETED | OUTPATIENT
Start: 2020-05-18 | End: 2020-05-18

## 2020-05-18 NOTE — ED INITIAL ASSESSMENT (HPI)
Pt reports lower back pain for the past 4 days, states he had an MRI recently. Denies any trauma. Patient ambulated to room and is wearing a mask. Patient reports he could not get out of bed this morning due to the pain.  States the medication that was give

## 2020-05-20 NOTE — ED PROVIDER NOTES
Patient Seen in: [facility] Emergency Department      History   Patient presents with:  Back Pain    Stated Complaint: lower back pain started 4 days ago was at [facility] did MRI     HPI    25 yo pleasant young AAM presents with ongoing lower back reviewed. All other systems reviewed and negative except as noted above.     Physical Exam     ED Triage Vitals [05/18/20 1614]   BP (!) 137/99   Pulse 96   Resp 18   Temp 97.9 °F (36.6 °C)   Temp src Temporal   SpO2 97 %   O2 Device None (Room air) Coordination: Coordination normal.   Psychiatric:         Behavior: Behavior normal.         Judgment: Judgment normal.         Uncomfortable  [place] male, but no acute distress, able to turn onto side, sits and stands antalgically but able t

## 2020-05-21 NOTE — PROGRESS NOTES
MRI is showing some lumbar disc protrusion. This can first be addressed with physical therapy. Please inform patient of above. If, for whatever reason, he prefers to first see Spine for consult givern severity of symptoms, that would be fine as well.

## 2020-06-03 ENCOUNTER — OFFICE VISIT (OUTPATIENT)
Dept: SURGERY | Facility: CLINIC | Age: 30
End: 2020-06-03
Payer: COMMERCIAL

## 2020-06-03 VITALS
WEIGHT: 277.81 LBS | BODY MASS INDEX: 34.54 KG/M2 | RESPIRATION RATE: 16 BRPM | OXYGEN SATURATION: 99 % | HEART RATE: 77 BPM | HEIGHT: 75 IN | SYSTOLIC BLOOD PRESSURE: 142 MMHG | DIASTOLIC BLOOD PRESSURE: 95 MMHG

## 2020-06-03 DIAGNOSIS — C47.9 MALIGNANT PERIPHERAL NERVE SHEATH TUMOR (HCC): Primary | ICD-10-CM

## 2020-06-03 PROCEDURE — 99214 OFFICE O/P EST MOD 30 MIN: CPT | Performed by: SURGERY

## 2020-06-03 RX ORDER — TRAMADOL HYDROCHLORIDE 50 MG/1
50 TABLET ORAL EVERY 6 HOURS PRN
COMMUNITY
End: 2021-05-10

## 2020-06-03 NOTE — CONSULTS
[facility]    Patient Name:  Shama Castellon   YOB: 1990   Gender:  Male   Appt Date:  6/3/2020   Provider:  Elizabeth Parra MD   Insurance:  [address] [place] [address],[address] [facility] Pulse 77   Resp 16   Ht 1.905 m (6' 3\")   Wt 126 kg (277 lb 12.8 oz)   SpO2 99%   BMI 34.72 kg/m²      Medications Reviewed:    Current Outpatient Medications:   •  traMADol HCl 50 MG Oral Tab, Take 50 mg by mouth every 6 (six) hours as needed for Pain. , Appearance: healthy-appearing, well-nourished, and well-developed. Level of Distress: NAD. Eyes: Sclerae: non-icteric. Neck: Neck: supple. Lymph Nodes: Lymph Nodes no cervical LAD, supraclavicular LAD, axillary LAD, or inguinal LAD.    Abdomen: Inspec

## 2020-06-12 ENCOUNTER — TELEPHONE (OUTPATIENT)
Dept: SURGERY | Facility: CLINIC | Age: 30
End: 2020-06-12

## 2020-06-12 NOTE — TELEPHONE ENCOUNTER
[facility] for patient to schedule his US of low back as ordered by Dr. Darien Jarrell. Central scheduling and callback numbers provided.

## 2020-06-19 ENCOUNTER — HOSPITAL ENCOUNTER (OUTPATIENT)
Dept: CT IMAGING | Age: 30
Discharge: HOME | End: 2020-06-19
Attending: PHYSICIAN ASSISTANT
Payer: COMMERCIAL

## 2020-06-19 DIAGNOSIS — C47.9 MALIGNANT PERIPHERAL NERVE SHEATH TUMOR (HCC): ICD-10-CM

## 2020-06-19 PROCEDURE — 71250 CT THORAX DX C-: CPT | Performed by: PHYSICIAN ASSISTANT

## 2020-10-29 ENCOUNTER — E-VISIT (OUTPATIENT)
Dept: TELEHEALTH | Age: 30
End: 2020-10-29

## 2020-10-29 ENCOUNTER — APPOINTMENT (OUTPATIENT)
Dept: LAB | Facility: HOSPITAL | Age: 30
End: 2020-10-29
Attending: NURSE PRACTITIONER
Payer: COMMERCIAL

## 2020-10-29 DIAGNOSIS — Z20.822 SUSPECTED COVID-19 VIRUS INFECTION: ICD-10-CM

## 2020-10-29 DIAGNOSIS — Z20.822 SUSPECTED COVID-19 VIRUS INFECTION: Primary | ICD-10-CM

## 2020-10-29 PROCEDURE — 99421 OL DIG E/M SVC 5-10 MIN: CPT | Performed by: NURSE PRACTITIONER

## 2020-10-29 NOTE — PROGRESS NOTES
Patient requested an E-Visit. After reviewing history. Symptoms, ordering lab testing, 8 minutes of time was accrued. Please see E-Visit for further information.

## 2020-11-06 ENCOUNTER — HOSPITAL ENCOUNTER (OUTPATIENT)
Age: 30
Discharge: HOME | End: 2020-11-06
Payer: COMMERCIAL

## 2020-11-06 VITALS
OXYGEN SATURATION: 99 % | SYSTOLIC BLOOD PRESSURE: 123 MMHG | HEART RATE: 69 BPM | RESPIRATION RATE: 16 BRPM | DIASTOLIC BLOOD PRESSURE: 96 MMHG | TEMPERATURE: 98 F

## 2020-11-06 DIAGNOSIS — N34.2 URETHRITIS: ICD-10-CM

## 2020-11-06 DIAGNOSIS — R30.0 DYSURIA: Primary | ICD-10-CM

## 2020-11-06 PROCEDURE — 99214 OFFICE O/P EST MOD 30 MIN: CPT

## 2020-11-06 PROCEDURE — 81025 URINE PREGNANCY TEST: CPT | Performed by: PHYSICIAN ASSISTANT

## 2020-11-06 PROCEDURE — 87491 CHLMYD TRACH DNA AMP PROBE: CPT | Performed by: PHYSICIAN ASSISTANT

## 2020-11-06 PROCEDURE — 81002 URINALYSIS NONAUTO W/O SCOPE: CPT | Performed by: PHYSICIAN ASSISTANT

## 2020-11-06 PROCEDURE — 87591 N.GONORRHOEAE DNA AMP PROB: CPT | Performed by: PHYSICIAN ASSISTANT

## 2020-11-06 PROCEDURE — 96372 THER/PROPH/DIAG INJ SC/IM: CPT

## 2020-11-06 RX ORDER — AZITHROMYCIN 250 MG/1
1000 TABLET, FILM COATED ORAL ONCE
Status: COMPLETED | OUTPATIENT
Start: 2020-11-06 | End: 2020-11-06

## 2020-11-06 NOTE — ED PROVIDER NOTES
Patient Seen in: [facility]      History   Patient presents with:  Urinary Symptoms    Stated Complaint: UTI X 2 DAYS    HPI    38-year-old male who comes in today complaining of dysuria and some white discharge out of the penile region f tenderness, bowel sounds active all four quadrants, no mass or organomegaly.  No rebound tenderness or guarding, negative CVA bilaterally              Lymphatic:  No adenopathy  Skin/Hair/Nails:  Skin warm, dry and intact, no rashes or abnormal dyspigmentat

## 2021-05-09 ENCOUNTER — HOSPITAL ENCOUNTER (EMERGENCY)
Age: 31
Discharge: HOME | End: 2021-05-09
Attending: EMERGENCY MEDICINE

## 2021-05-09 ENCOUNTER — APPOINTMENT (OUTPATIENT)
Dept: GENERAL RADIOLOGY | Age: 31
End: 2021-05-09
Attending: PHYSICIAN ASSISTANT

## 2021-05-09 VITALS
TEMPERATURE: 97 F | BODY MASS INDEX: 27.35 KG/M2 | HEART RATE: 70 BPM | DIASTOLIC BLOOD PRESSURE: 97 MMHG | RESPIRATION RATE: 20 BRPM | OXYGEN SATURATION: 98 % | SYSTOLIC BLOOD PRESSURE: 136 MMHG | WEIGHT: 220 LBS | HEIGHT: 75 IN

## 2021-05-09 DIAGNOSIS — R03.0 ELEVATED BLOOD PRESSURE READING: ICD-10-CM

## 2021-05-09 DIAGNOSIS — J06.9 VIRAL UPPER RESPIRATORY ILLNESS: Primary | ICD-10-CM

## 2021-05-09 PROCEDURE — 99283 EMERGENCY DEPT VISIT LOW MDM: CPT

## 2021-05-09 PROCEDURE — 71045 X-RAY EXAM CHEST 1 VIEW: CPT | Performed by: PHYSICIAN ASSISTANT

## 2021-05-10 ENCOUNTER — HOSPITAL ENCOUNTER (EMERGENCY)
Facility: HOSPITAL | Age: 31
Discharge: HOME | End: 2021-05-10
Attending: EMERGENCY MEDICINE

## 2021-05-10 ENCOUNTER — APPOINTMENT (OUTPATIENT)
Dept: GENERAL RADIOLOGY | Facility: HOSPITAL | Age: 31
End: 2021-05-10
Attending: EMERGENCY MEDICINE

## 2021-05-10 VITALS
DIASTOLIC BLOOD PRESSURE: 93 MMHG | WEIGHT: 280 LBS | BODY MASS INDEX: 35.94 KG/M2 | HEIGHT: 74 IN | OXYGEN SATURATION: 98 % | TEMPERATURE: 98 F | RESPIRATION RATE: 23 BRPM | SYSTOLIC BLOOD PRESSURE: 126 MMHG | HEART RATE: 66 BPM

## 2021-05-10 DIAGNOSIS — J06.9 UPPER RESPIRATORY TRACT INFECTION, UNSPECIFIED TYPE: Primary | ICD-10-CM

## 2021-05-10 PROCEDURE — 84484 ASSAY OF TROPONIN QUANT: CPT | Performed by: EMERGENCY MEDICINE

## 2021-05-10 PROCEDURE — 99284 EMERGENCY DEPT VISIT MOD MDM: CPT

## 2021-05-10 PROCEDURE — 85025 COMPLETE CBC W/AUTO DIFF WBC: CPT | Performed by: EMERGENCY MEDICINE

## 2021-05-10 PROCEDURE — 71045 X-RAY EXAM CHEST 1 VIEW: CPT | Performed by: EMERGENCY MEDICINE

## 2021-05-10 PROCEDURE — 93005 ELECTROCARDIOGRAM TRACING: CPT

## 2021-05-10 PROCEDURE — 80053 COMPREHEN METABOLIC PANEL: CPT | Performed by: EMERGENCY MEDICINE

## 2021-05-10 PROCEDURE — 93010 ELECTROCARDIOGRAM REPORT: CPT

## 2021-05-10 PROCEDURE — 36415 COLL VENOUS BLD VENIPUNCTURE: CPT

## 2021-05-10 PROCEDURE — 83880 ASSAY OF NATRIURETIC PEPTIDE: CPT | Performed by: EMERGENCY MEDICINE

## 2021-05-10 NOTE — ED PROVIDER NOTES
Patient Seen in: [facility] Emergency Department In [place]      History   Patient presents with:  Cough/URI  Body ache and/or chills    Stated Complaint: 3 days of runny nose, chills, cough     HPI/Subjective:   HPI    Pleasant 49-year-old male.   History no thyromegaly or lymphadenopathy. Eye examination: EOMs are intact, normal conjunctival  ENT: Audible nasal congestion. Moderate rhinorrhea. Oropharynx visually benign.   Auditory canals demonstrate no injection or loss of landmarks  Lung: No distress, evaluation. If you develop any chest pain or shortness of breath when exercising please discontinue immediately. For now, supportive measures.   Vitamin C and zinc.               Disposition and Plan     Clinical Impression:  Viral upper respiratory illne

## 2021-05-11 NOTE — ED PROVIDER NOTES
Patient Seen in: [facility] Emergency Department      History   Patient presents with:  Difficulty Breathing    Stated Complaint: SOB, abnormal chest x-ray showing cardiomegly     HPI/Subjective:   HPI    Alonzo Lawson is a pleasant 40-year-old male pres kg/m²         Physical Exam    Generally the patient is alert and oriented ×3 and appears in no distress  HEENT exam: Tympanic membranes are clear. Canals are normal with no auricular preauricular or mastoid tenderness.   Oropharyngeal exam shows no uvula dietary restrictions exercise and reevaluation with a primary care doctor he will keep a log of his blood pressures at home.   He is to return to the emergency department for worsening symptoms other complaints                             Disposition and Pl

## 2021-05-11 NOTE — ED INITIAL ASSESSMENT (HPI)
Sent to ED by cardiologist for evaluation of enlarged heart noted on chest xray last pm / pt c/o shortness of breath

## 2021-05-13 ENCOUNTER — HOSPITAL ENCOUNTER (EMERGENCY)
Age: 31
Discharge: HOME | End: 2021-05-13
Attending: EMERGENCY MEDICINE

## 2021-05-13 VITALS
DIASTOLIC BLOOD PRESSURE: 93 MMHG | SYSTOLIC BLOOD PRESSURE: 138 MMHG | BODY MASS INDEX: 35.94 KG/M2 | HEART RATE: 83 BPM | HEIGHT: 74 IN | OXYGEN SATURATION: 98 % | RESPIRATION RATE: 18 BRPM | WEIGHT: 280 LBS | TEMPERATURE: 98 F

## 2021-05-13 DIAGNOSIS — J06.9 VIRAL URI: Primary | ICD-10-CM

## 2021-05-13 PROCEDURE — 99282 EMERGENCY DEPT VISIT SF MDM: CPT

## 2021-05-14 NOTE — ED PROVIDER NOTES
Patient Seen in: [facility] Emergency Department In [place]      History   Patient presents with:  Cough/URI  Body ache and/or chills    Stated Complaint: chills, fatigue, cough    HPI/Subjective:   HPI    49-year-old male presents emergency room for eval SpO2 98 %   O2 Device None (Room air)       Current:BP (!) 138/93   Pulse 83   Temp 98 °F (36.7 °C) (Temporal)   Resp 18   Ht 188 cm (6' 2\")   Wt 127 kg   SpO2 98%   BMI 35.95 kg/m²         Physical Exam    GENERAL: Patient is awake, alert, well-appeari

## 2021-05-14 NOTE — ED INITIAL ASSESSMENT (HPI)
Pt reports cough, fever, chills and fatigue for 12 days. Pt was here on 5/10 and had negative COVID test. Pt states sxs are worse.

## 2022-01-03 ENCOUNTER — HOSPITAL ENCOUNTER (EMERGENCY)
Age: 32
Discharge: HOME | End: 2022-01-03
Attending: EMERGENCY MEDICINE
Payer: OTHER GOVERNMENT

## 2022-01-03 VITALS
DIASTOLIC BLOOD PRESSURE: 97 MMHG | HEART RATE: 91 BPM | TEMPERATURE: 99 F | WEIGHT: 240 LBS | OXYGEN SATURATION: 97 % | HEIGHT: 74 IN | BODY MASS INDEX: 30.8 KG/M2 | RESPIRATION RATE: 16 BRPM | SYSTOLIC BLOOD PRESSURE: 140 MMHG

## 2022-01-03 DIAGNOSIS — Z20.822 SUSPECTED COVID-19 VIRUS INFECTION: Primary | ICD-10-CM

## 2022-01-03 PROCEDURE — 99283 EMERGENCY DEPT VISIT LOW MDM: CPT

## 2022-01-03 RX ORDER — BENZONATATE 100 MG/1
100 CAPSULE ORAL 3 TIMES DAILY PRN
Qty: 30 CAPSULE | Refills: 0 | Status: SHIPPED | OUTPATIENT
Start: 2022-01-03 | End: 2022-02-02

## 2022-01-03 NOTE — ED PROVIDER NOTES
Patient Seen in: [facility] Emergency Department In [place]      History   Patient presents with:  Cough/URI  Nausea/Vomiting/Diarrhea    Stated Complaint: uri, sob    Subjective:   HPI      Patient presents for Covid testing.   He is unvaccinated  Has had icterus. Conjunctiva/sclera: Conjunctivae normal.   Cardiovascular:      Rate and Rhythm: Normal rate. Pulmonary:      Effort: Pulmonary effort is normal. No respiratory distress. Abdominal:      General: There is no distension. Palpations:  Ab

## 2022-01-03 NOTE — ED INITIAL ASSESSMENT (HPI)
Fever 2 days, vomiting x1 yesterday, cough, pain lower back like muscle pain after working out, taking Dayquil, is not vaccinated

## 2022-01-05 LAB — SARS-COV-2 RNA RESP QL NAA+PROBE: DETECTED

## 2022-01-12 NOTE — ED AVS SNAPSHOT
14 y o  male   Chief complaint: No chief complaint on file  HPI: 12yo male presenting for L wrist injury  States that he fell last night while playing basketball  Was seen and evaluated in ED, given splint and told to follow up with ortho  Denies numbness/tingling    Location: L wrist  Severity: mild   Timin day ago  Modifying factors: None  Associated Signs/symptoms: Pain with movement/weight bearing     Past Medical History:   Diagnosis Date    HL (hearing loss)      Past Surgical History:   Procedure Laterality Date    APPENDECTOMY       No family history on file  Social History     Socioeconomic History    Marital status: Single     Spouse name: Not on file    Number of children: Not on file    Years of education: Not on file    Highest education level: Not on file   Occupational History    Not on file   Tobacco Use    Smoking status: Never Smoker    Smokeless tobacco: Never Used   Substance and Sexual Activity    Alcohol use: No    Drug use: No    Sexual activity: Not on file   Other Topics Concern    Not on file   Social History Narrative    Not on file     Social Determinants of Health     Financial Resource Strain: Not on file   Food Insecurity: Not on file   Transportation Needs: Not on file   Physical Activity: Not on file   Stress: Not on file   Intimate Partner Violence: Not on file   Housing Stability: Not on file     Current Outpatient Medications   Medication Sig Dispense Refill    albuterol (PROVENTIL HFA,VENTOLIN HFA) 90 mcg/act inhaler Inhale (Patient not taking: Reported on 2021 )       No current facility-administered medications for this visit  Patient has no known allergies  Patient's medications, allergies, past medical, surgical, social and family histories were reviewed and updated as appropriate  Unless otherwise noted above, past medical history, family history, and social history are noncontributory      Review of Systems:  Constitutional: no Myriam Nicko   MRN: [id number]    Department:  THE [facility] Emergency Department in [place]   Date of Visit:  3/13/2019           Disclosure     Insurance plans vary and the physician(s) referred by the ER may not be covered by your plan.  Please conta chills  Respiratory: no chest pain  Cardio: no syncope  GI: no abdominal pain  : no urinary continence  Neuro: no headaches  Psych: no anxiety  Skin: no rash  MS: except as noted in HPI and chief complaint  Allergic/immunology: no contact dermatitis    Physical Exam:  There were no vitals taken for this visit  General:  Constitutional: Patient is cooperative  Does not have a sickly appearance  Does not appear ill  No distress  Head: Atraumatic  Eyes: Conjunctivae are normal    Cardiovascular: 2+ radial pulses bilaterally with brisk cap refill of all fingers  Pulmonary/Chest: Effort normal  No stridor  Abdomen: soft NT/ND  Skin: Skin is warm and dry  No rash noted  No erythema  No skin breakdown  Psychiatric: mood/affect appropriate, behavior is normal   Gait: Appropriate gait observed per baseline ambulatory status  bilateral upper extremities:  nontender elbow  full symmetric painless elbow range of motion  no joint instability suggested with AROM  strength biceps/triceps 5/5  skin intact without evidence of lesions/trauma    bilateral lower extremities:  nontender throughout hip/knee/ankle  full painless knee ROM  no evidence of ligamentous instability in knee  knee flexion/extension 5/5  skin intact without evidence of trauma/lesions    L wrist:   Skin intact, no evidence of lesion   Mild swelling noted   ROM: full ROM of elbow and fingers, able to pronate/supinate  Limited ROM of wrist d/t pain/stiffness   Tender to palpation of distal radius   NVI     Studies reviewed:  XR L wrist 3vw - fracture of distal radius/ulna     Impression:  Distal radius/ulna fracture    Plan:  Patient's caretaker was present and provided pertinent history  I personally reviewed all images and discussed them with the caretaker  All plans outlined below were discussed with the patient's caretaker present for this visit  Treatment options were discussed in detail   After considering all various options, the tell this physician (or your personal doctor if your instructions are to return to your personal doctor) about any new or lasting problems. The primary care or specialist physician will see patients referred from the [facility] Emergency Department.  Chacho Bond treatment plan will include:  Cast on today without complications  Leave on for 4 weeks, then follow up for cast off and repeat XR  No gym/sports while in cast, school note given  Utilize motrin as needed for pain       Fracture / Dislocation Treatment    Date/Time: 1/12/2022 2:02 PM  Performed by: Addie Bernal MD  Authorized by: Addie Bernal MD     Patient Location:  Clinic  Verbal consent obtained?: Yes    Risks and benefits: Risks, benefits and alternatives were discussed    Consent given by:  Patient and parent  Patient states understanding of procedure being performed: Yes    Patient identity confirmed:  Verbally with patient  Time out: Immediately prior to the procedure a time out was called    Injury location:  Wrist  Location details:  Left wrist  Injury type:  Fracture  Fracture type: distal radius    Fracture type: distal radius    Manipulation performed?: No    Immobilization:  Cast  Cast type:  Short arm  Supplies used:  Cotton padding and fiberglass  Patient tolerance:  Patient tolerated the procedure well with no immediate complications

## 2022-03-24 ENCOUNTER — APPOINTMENT (OUTPATIENT)
Dept: GENERAL RADIOLOGY | Age: 32
End: 2022-03-24
Attending: EMERGENCY MEDICINE
Payer: COMMERCIAL

## 2022-03-24 ENCOUNTER — HOSPITAL ENCOUNTER (EMERGENCY)
Age: 32
Discharge: HOME | End: 2022-03-24
Attending: EMERGENCY MEDICINE
Payer: COMMERCIAL

## 2022-03-24 VITALS
RESPIRATION RATE: 18 BRPM | HEART RATE: 80 BPM | HEIGHT: 75 IN | WEIGHT: 200 LBS | BODY MASS INDEX: 24.87 KG/M2 | OXYGEN SATURATION: 99 % | TEMPERATURE: 98 F | SYSTOLIC BLOOD PRESSURE: 123 MMHG | DIASTOLIC BLOOD PRESSURE: 86 MMHG

## 2022-03-24 DIAGNOSIS — S40.012A CONTUSION OF LEFT SHOULDER, INITIAL ENCOUNTER: ICD-10-CM
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
## 2022-03-24 DIAGNOSIS — S60.222A CONTUSION OF LEFT HAND, INITIAL ENCOUNTER: ICD-10-CM
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
## 2022-03-24 DIAGNOSIS — S83.92XA SPRAIN OF LEFT KNEE, UNSPECIFIED LIGAMENT, INITIAL ENCOUNTER: ICD-10-CM

## 2022-03-24 PROCEDURE — 73562 X-RAY EXAM OF KNEE 3: CPT | Performed by: EMERGENCY MEDICINE

## 2022-03-24 PROCEDURE — 73130 X-RAY EXAM OF HAND: CPT | Performed by: EMERGENCY MEDICINE

## 2022-03-24 PROCEDURE — 99284 EMERGENCY DEPT VISIT MOD MDM: CPT

## 2022-03-24 PROCEDURE — 73030 X-RAY EXAM OF SHOULDER: CPT | Performed by: EMERGENCY MEDICINE

## 2022-03-24 RX ORDER — IBUPROFEN 800 MG/1
800 TABLET ORAL EVERY 8 HOURS PRN
Qty: 30 TABLET | Refills: 0 | Status: SHIPPED | OUTPATIENT
Start: 2022-03-24 | End: 2022-03-31

## 2022-07-12 ENCOUNTER — HOSPITAL ENCOUNTER (EMERGENCY)
Age: 32
Discharge: LEFT BEFORE DISCHARGE | End: 2022-07-12
Payer: COMMERCIAL

## 2022-07-13 ENCOUNTER — HOSPITAL ENCOUNTER (EMERGENCY)
Facility: HOSPITAL | Age: 32
Discharge: HOME | End: 2022-07-13
Attending: EMERGENCY MEDICINE
Payer: COMMERCIAL

## 2022-07-13 VITALS
OXYGEN SATURATION: 97 % | HEART RATE: 89 BPM | RESPIRATION RATE: 18 BRPM | SYSTOLIC BLOOD PRESSURE: 128 MMHG | TEMPERATURE: 97 F | DIASTOLIC BLOOD PRESSURE: 83 MMHG

## 2022-07-13 DIAGNOSIS — M54.16 LUMBAR RADICULOPATHY: Primary | ICD-10-CM

## 2022-07-13 PROCEDURE — 99283 EMERGENCY DEPT VISIT LOW MDM: CPT

## 2022-07-13 RX ORDER — KETOROLAC TROMETHAMINE 10 MG/1
10 TABLET, FILM COATED ORAL EVERY 6 HOURS PRN
Qty: 20 TABLET | Refills: 0 | Status: SHIPPED | OUTPATIENT
Start: 2022-07-13 | End: 2022-07-20

## 2022-07-13 RX ORDER — METHYLPREDNISOLONE 4 MG/1
TABLET ORAL
Qty: 1 EACH | Refills: 0 | Status: SHIPPED | OUTPATIENT
Start: 2022-07-13

## 2022-07-13 RX ORDER — CYCLOBENZAPRINE HCL 10 MG
10 TABLET ORAL 3 TIMES DAILY PRN
Qty: 20 TABLET | Refills: 0 | Status: SHIPPED | OUTPATIENT
Start: 2022-07-13 | End: 2022-07-20

## 2023-04-25 ENCOUNTER — HOSPITAL ENCOUNTER (EMERGENCY)
Age: 33
Discharge: HOME | End: 2023-04-25
Attending: EMERGENCY MEDICINE

## 2023-04-25 ENCOUNTER — APPOINTMENT (OUTPATIENT)
Dept: CT IMAGING | Age: 33
End: 2023-04-25
Attending: EMERGENCY MEDICINE

## 2023-04-25 VITALS
WEIGHT: 261 LBS | TEMPERATURE: 98 F | RESPIRATION RATE: 16 BRPM | DIASTOLIC BLOOD PRESSURE: 75 MMHG | HEART RATE: 74 BPM | OXYGEN SATURATION: 98 % | HEIGHT: 75 IN | BODY MASS INDEX: 32.45 KG/M2 | SYSTOLIC BLOOD PRESSURE: 114 MMHG

## 2023-04-25 DIAGNOSIS — R10.9 FLANK PAIN: Primary | ICD-10-CM

## 2023-04-25 LAB
ALBUMIN SERPL-MCNC: 3.7 G/DL (ref 3.4–5)
ALBUMIN/GLOB SERPL: 1.1 {RATIO} (ref 1–2)
ALP LIVER SERPL-CCNC: 78 U/L
ALT SERPL-CCNC: 68 U/L
ANION GAP SERPL CALC-SCNC: 5 MMOL/L (ref 0–18)
AST SERPL-CCNC: 36 U/L (ref 15–37)
BASOPHILS # BLD AUTO: 0.05 X10(3) UL (ref 0–0.2)
BASOPHILS NFR BLD AUTO: 0.8 %
BILIRUB SERPL-MCNC: 0.4 MG/DL (ref 0.1–2)
BUN BLD-MCNC: 12 MG/DL (ref 7–18)
CALCIUM BLD-MCNC: 9.4 MG/DL (ref 8.5–10.1)
CHLORIDE SERPL-SCNC: 106 MMOL/L (ref 98–112)
CO2 SERPL-SCNC: 27 MMOL/L (ref 21–32)
CREAT BLD-MCNC: 1.04 MG/DL
EOSINOPHIL # BLD AUTO: 0.21 X10(3) UL (ref 0–0.7)
EOSINOPHIL NFR BLD AUTO: 3.3 %
ERYTHROCYTE [DISTWIDTH] IN BLOOD BY AUTOMATED COUNT: 12.4 %
GFR SERPLBLD BASED ON 1.73 SQ M-ARVRAT: 96 ML/MIN/1.73M2 (ref 60–?)
GLOBULIN PLAS-MCNC: 3.3 G/DL (ref 2.8–4.4)
GLUCOSE BLD-MCNC: 112 MG/DL (ref 70–99)
HCT VFR BLD AUTO: 46.6 %
HGB BLD-MCNC: 15.8 G/DL
IMM GRANULOCYTES # BLD AUTO: 0.02 X10(3) UL (ref 0–1)
IMM GRANULOCYTES NFR BLD: 0.3 %
LIPASE SERPL-CCNC: 33 U/L (ref 13–75)
LYMPHOCYTES # BLD AUTO: 2.17 X10(3) UL (ref 1–4)
LYMPHOCYTES NFR BLD AUTO: 34.3 %
MCH RBC QN AUTO: 30.3 PG (ref 26–34)
MCHC RBC AUTO-ENTMCNC: 33.9 G/DL (ref 31–37)
MCV RBC AUTO: 89.3 FL
MONOCYTES # BLD AUTO: 0.65 X10(3) UL (ref 0.1–1)
MONOCYTES NFR BLD AUTO: 10.3 %
NEUTROPHILS # BLD AUTO: 3.23 X10 (3) UL (ref 1.5–7.7)
NEUTROPHILS # BLD AUTO: 3.23 X10(3) UL (ref 1.5–7.7)
NEUTROPHILS NFR BLD AUTO: 51 %
OSMOLALITY SERPL CALC.SUM OF ELEC: 287 MOSM/KG (ref 275–295)
PLATELET # BLD AUTO: 332 10(3)UL (ref 150–450)
POTASSIUM SERPL-SCNC: 4.1 MMOL/L (ref 3.5–5.1)
PROT SERPL-MCNC: 7 G/DL (ref 6.4–8.2)
RBC # BLD AUTO: 5.22 X10(6)UL
SODIUM SERPL-SCNC: 138 MMOL/L (ref 136–145)
WBC # BLD AUTO: 6.3 X10(3) UL (ref 4–11)

## 2023-04-25 PROCEDURE — 36415 COLL VENOUS BLD VENIPUNCTURE: CPT | Performed by: EMERGENCY MEDICINE

## 2023-04-25 PROCEDURE — 83690 ASSAY OF LIPASE: CPT | Performed by: EMERGENCY MEDICINE

## 2023-04-25 PROCEDURE — 80053 COMPREHEN METABOLIC PANEL: CPT | Performed by: EMERGENCY MEDICINE

## 2023-04-25 PROCEDURE — 85025 COMPLETE CBC W/AUTO DIFF WBC: CPT | Performed by: EMERGENCY MEDICINE

## 2023-04-25 PROCEDURE — 99284 EMERGENCY DEPT VISIT MOD MDM: CPT | Performed by: EMERGENCY MEDICINE

## 2023-04-25 PROCEDURE — 74177 CT ABD & PELVIS W/CONTRAST: CPT | Performed by: EMERGENCY MEDICINE

## 2023-04-25 RX ORDER — TIZANIDINE 2 MG/1
2 TABLET ORAL EVERY 6 HOURS PRN
Qty: 30 TABLET | Refills: 0 | Status: SHIPPED | OUTPATIENT
Start: 2023-04-25 | End: 2023-05-05

## 2023-04-25 NOTE — ED INITIAL ASSESSMENT (HPI)
PT to the ED for evaluation of pain to the R flank, R knee and R hand. No know injuries. PT reports that 8 years ago he had a tumor in his back that caused similar pain.

## 2023-09-06 ENCOUNTER — HOSPITAL ENCOUNTER (EMERGENCY)
Facility: HOSPITAL | Age: 33
Discharge: HOME | End: 2023-09-06
Attending: EMERGENCY MEDICINE
Payer: MEDICAID

## 2023-09-06 ENCOUNTER — APPOINTMENT (OUTPATIENT)
Dept: CT IMAGING | Facility: HOSPITAL | Age: 33
End: 2023-09-06
Attending: EMERGENCY MEDICINE
Payer: MEDICAID

## 2023-09-06 VITALS
DIASTOLIC BLOOD PRESSURE: 94 MMHG | HEART RATE: 61 BPM | HEIGHT: 74 IN | OXYGEN SATURATION: 97 % | BODY MASS INDEX: 35.94 KG/M2 | RESPIRATION RATE: 16 BRPM | TEMPERATURE: 98 F | WEIGHT: 280 LBS | SYSTOLIC BLOOD PRESSURE: 119 MMHG

## 2023-09-06 DIAGNOSIS — K21.9 GASTROESOPHAGEAL REFLUX DISEASE, UNSPECIFIED WHETHER ESOPHAGITIS PRESENT: Primary | ICD-10-CM

## 2023-09-06 DIAGNOSIS — S30.851A INTRA-ABDOMINAL FOREIGN BODY: ICD-10-CM

## 2023-09-06 LAB
ALBUMIN SERPL-MCNC: 4 G/DL (ref 3.4–5)
ALBUMIN/GLOB SERPL: 1.2 {RATIO} (ref 1–2)
ALP LIVER SERPL-CCNC: 83 U/L
ALT SERPL-CCNC: 75 U/L
ANION GAP SERPL CALC-SCNC: 2 MMOL/L (ref 0–18)
AST SERPL-CCNC: 39 U/L (ref 15–37)
BASOPHILS # BLD AUTO: 0.04 X10(3) UL (ref 0–0.2)
BASOPHILS NFR BLD AUTO: 0.6 %
BILIRUB SERPL-MCNC: 0.5 MG/DL (ref 0.1–2)
BUN BLD-MCNC: 8 MG/DL (ref 7–18)
CALCIUM BLD-MCNC: 9.2 MG/DL (ref 8.5–10.1)
CHLORIDE SERPL-SCNC: 106 MMOL/L (ref 98–112)
CO2 SERPL-SCNC: 31 MMOL/L (ref 21–32)
CREAT BLD-MCNC: 1.1 MG/DL
EGFRCR SERPLBLD CKD-EPI 2021: 90 ML/MIN/1.73M2 (ref 60–?)
EOSINOPHIL # BLD AUTO: 0.31 X10(3) UL (ref 0–0.7)
EOSINOPHIL NFR BLD AUTO: 4.3 %
ERYTHROCYTE [DISTWIDTH] IN BLOOD BY AUTOMATED COUNT: 11.9 %
GLOBULIN PLAS-MCNC: 3.3 G/DL (ref 2.8–4.4)
GLUCOSE BLD-MCNC: 99 MG/DL (ref 70–99)
HCT VFR BLD AUTO: 47.8 %
HGB BLD-MCNC: 15.8 G/DL
IMM GRANULOCYTES # BLD AUTO: 0.02 X10(3) UL (ref 0–1)
IMM GRANULOCYTES NFR BLD: 0.3 %
LIPASE SERPL-CCNC: 211 U/L (ref 13–75)
LYMPHOCYTES # BLD AUTO: 2.54 X10(3) UL (ref 1–4)
LYMPHOCYTES NFR BLD AUTO: 34.9 %
MCH RBC QN AUTO: 29.3 PG (ref 26–34)
MCHC RBC AUTO-ENTMCNC: 33.1 G/DL (ref 31–37)
MCV RBC AUTO: 88.7 FL
MONOCYTES # BLD AUTO: 1.02 X10(3) UL (ref 0.1–1)
MONOCYTES NFR BLD AUTO: 14 %
NEUTROPHILS # BLD AUTO: 3.34 X10 (3) UL (ref 1.5–7.7)
NEUTROPHILS # BLD AUTO: 3.34 X10(3) UL (ref 1.5–7.7)
NEUTROPHILS NFR BLD AUTO: 45.9 %
OSMOLALITY SERPL CALC.SUM OF ELEC: 286 MOSM/KG (ref 275–295)
PLATELET # BLD AUTO: 329 10(3)UL (ref 150–450)
POTASSIUM SERPL-SCNC: 4.5 MMOL/L (ref 3.5–5.1)
PROT SERPL-MCNC: 7.3 G/DL (ref 6.4–8.2)
RBC # BLD AUTO: 5.39 X10(6)UL
SODIUM SERPL-SCNC: 139 MMOL/L (ref 136–145)
WBC # BLD AUTO: 7.3 X10(3) UL (ref 4–11)

## 2023-09-06 PROCEDURE — 83690 ASSAY OF LIPASE: CPT | Performed by: EMERGENCY MEDICINE

## 2023-09-06 PROCEDURE — 36415 COLL VENOUS BLD VENIPUNCTURE: CPT

## 2023-09-06 PROCEDURE — 99284 EMERGENCY DEPT VISIT MOD MDM: CPT

## 2023-09-06 PROCEDURE — 74177 CT ABD & PELVIS W/CONTRAST: CPT | Performed by: EMERGENCY MEDICINE

## 2023-09-06 PROCEDURE — 99285 EMERGENCY DEPT VISIT HI MDM: CPT

## 2023-09-06 PROCEDURE — 85025 COMPLETE CBC W/AUTO DIFF WBC: CPT | Performed by: EMERGENCY MEDICINE

## 2023-09-06 PROCEDURE — 80053 COMPREHEN METABOLIC PANEL: CPT | Performed by: EMERGENCY MEDICINE

## 2023-09-06 RX ORDER — OMEPRAZOLE 40 MG/1
40 CAPSULE, DELAYED RELEASE ORAL DAILY
Qty: 30 CAPSULE | Refills: 0 | Status: SHIPPED | OUTPATIENT
Start: 2023-09-06 | End: 2023-10-06

## 2023-09-06 NOTE — ED QUICK NOTES
Patient reports that over the past month he feels like he has had food bolus after eating more pronounced lying flat. He feels like food does not pass normally.  Patient states in April he was seen at the PED and told he had a metal FB on his CT as an incidental finding that was never explained not sure if that is still  causing an issue He also reports that he was told he does not have his gll bladder and he \" never had gallbladder surgery\"

## 2023-09-06 NOTE — ED INITIAL ASSESSMENT (HPI)
Patient feels like when he eats the food is sitting in his chest. Patient states that he feels like there is no fluid passing through his right side. Patient states that he went to PED and was told there was metal in his throat.

## 2024-04-28 ENCOUNTER — HOSPITAL ENCOUNTER (EMERGENCY)
Age: 34
Discharge: HOME | End: 2024-04-28
Attending: EMERGENCY MEDICINE
Payer: MEDICAID

## 2024-04-28 ENCOUNTER — APPOINTMENT (OUTPATIENT)
Dept: GENERAL RADIOLOGY | Age: 34
End: 2024-04-28
Attending: EMERGENCY MEDICINE
Payer: MEDICAID

## 2024-04-28 VITALS
HEART RATE: 66 BPM | HEIGHT: 73 IN | DIASTOLIC BLOOD PRESSURE: 94 MMHG | TEMPERATURE: 98 F | RESPIRATION RATE: 16 BRPM | SYSTOLIC BLOOD PRESSURE: 118 MMHG | BODY MASS INDEX: 37.11 KG/M2 | OXYGEN SATURATION: 98 % | WEIGHT: 280 LBS

## 2024-04-28 DIAGNOSIS — R07.89 CHEST PAIN, ATYPICAL: ICD-10-CM

## 2024-04-28 DIAGNOSIS — E86.0 DEHYDRATION: ICD-10-CM

## 2024-04-28 DIAGNOSIS — N39.0 URINARY TRACT INFECTION WITHOUT HEMATURIA, SITE UNSPECIFIED: ICD-10-CM

## 2024-04-28 DIAGNOSIS — R42 DIZZINESS: Primary | ICD-10-CM

## 2024-04-28 LAB
ANION GAP SERPL CALC-SCNC: 6 MMOL/L (ref 0–18)
ATRIAL RATE: 89 BPM
BASOPHILS # BLD AUTO: 0.04 X10(3) UL (ref 0–0.2)
BASOPHILS NFR BLD AUTO: 0.6 %
BILIRUB UR QL STRIP.AUTO: NEGATIVE
BUN BLD-MCNC: 12 MG/DL (ref 9–23)
CALCIUM BLD-MCNC: 9.2 MG/DL (ref 8.5–10.1)
CHLORIDE SERPL-SCNC: 108 MMOL/L (ref 98–112)
CLARITY UR REFRACT.AUTO: CLEAR
CO2 SERPL-SCNC: 26 MMOL/L (ref 21–32)
COLOR UR AUTO: YELLOW
CREAT BLD-MCNC: 1.27 MG/DL
D DIMER PPP FEU-MCNC: <0.27 UG/ML FEU (ref ?–0.5)
EGFRCR SERPLBLD CKD-EPI 2021: 75 ML/MIN/1.73M2 (ref 60–?)
EOSINOPHIL # BLD AUTO: 0.05 X10(3) UL (ref 0–0.7)
EOSINOPHIL NFR BLD AUTO: 0.7 %
ERYTHROCYTE [DISTWIDTH] IN BLOOD BY AUTOMATED COUNT: 12.1 %
GLUCOSE BLD-MCNC: 89 MG/DL (ref 70–99)
GLUCOSE UR STRIP.AUTO-MCNC: NEGATIVE MG/DL
HCT VFR BLD AUTO: 45.3 %
HGB BLD-MCNC: 15.3 G/DL
IMM GRANULOCYTES # BLD AUTO: 0.02 X10(3) UL (ref 0–1)
IMM GRANULOCYTES NFR BLD: 0.3 %
KETONES UR STRIP.AUTO-MCNC: NEGATIVE MG/DL
LYMPHOCYTES # BLD AUTO: 1.69 X10(3) UL (ref 1–4)
LYMPHOCYTES NFR BLD AUTO: 24.7 %
MCH RBC QN AUTO: 29.8 PG (ref 26–34)
MCHC RBC AUTO-ENTMCNC: 33.8 G/DL (ref 31–37)
MCV RBC AUTO: 88.1 FL
MONOCYTES # BLD AUTO: 0.77 X10(3) UL (ref 0.1–1)
MONOCYTES NFR BLD AUTO: 11.2 %
NEUTROPHILS # BLD AUTO: 4.28 X10 (3) UL (ref 1.5–7.7)
NEUTROPHILS # BLD AUTO: 4.28 X10(3) UL (ref 1.5–7.7)
NEUTROPHILS NFR BLD AUTO: 62.5 %
NITRITE UR QL STRIP.AUTO: NEGATIVE
OSMOLALITY SERPL CALC.SUM OF ELEC: 289 MOSM/KG (ref 275–295)
P AXIS: 44 DEGREES
P-R INTERVAL: 150 MS
PH UR STRIP.AUTO: 8.5 [PH] (ref 5–8)
PLATELET # BLD AUTO: 318 10(3)UL (ref 150–450)
POTASSIUM SERPL-SCNC: 3.9 MMOL/L (ref 3.5–5.1)
PROT UR STRIP.AUTO-MCNC: NEGATIVE MG/DL
Q-T INTERVAL: 358 MS
QRS DURATION: 88 MS
QTC CALCULATION (BEZET): 435 MS
R AXIS: -14 DEGREES
RBC # BLD AUTO: 5.14 X10(6)UL
RBC UR QL AUTO: NEGATIVE
SODIUM SERPL-SCNC: 140 MMOL/L (ref 136–145)
SP GR UR STRIP.AUTO: 1.02 (ref 1–1.03)
T AXIS: 16 DEGREES
TROPONIN I SERPL HS-MCNC: 4 NG/L
UROBILINOGEN UR STRIP.AUTO-MCNC: 0.2 MG/DL
VENTRICULAR RATE: 89 BPM
WBC # BLD AUTO: 6.9 X10(3) UL (ref 4–11)

## 2024-04-28 PROCEDURE — 87077 CULTURE AEROBIC IDENTIFY: CPT | Performed by: EMERGENCY MEDICINE

## 2024-04-28 PROCEDURE — 96361 HYDRATE IV INFUSION ADD-ON: CPT

## 2024-04-28 PROCEDURE — 81015 MICROSCOPIC EXAM OF URINE: CPT | Performed by: EMERGENCY MEDICINE

## 2024-04-28 PROCEDURE — 87086 URINE CULTURE/COLONY COUNT: CPT | Performed by: EMERGENCY MEDICINE

## 2024-04-28 PROCEDURE — 85379 FIBRIN DEGRADATION QUANT: CPT | Performed by: EMERGENCY MEDICINE

## 2024-04-28 PROCEDURE — 93005 ELECTROCARDIOGRAM TRACING: CPT

## 2024-04-28 PROCEDURE — 81001 URINALYSIS AUTO W/SCOPE: CPT | Performed by: EMERGENCY MEDICINE

## 2024-04-28 PROCEDURE — 99284 EMERGENCY DEPT VISIT MOD MDM: CPT

## 2024-04-28 PROCEDURE — 71045 X-RAY EXAM CHEST 1 VIEW: CPT | Performed by: EMERGENCY MEDICINE

## 2024-04-28 PROCEDURE — 96360 HYDRATION IV INFUSION INIT: CPT

## 2024-04-28 PROCEDURE — 85025 COMPLETE CBC W/AUTO DIFF WBC: CPT | Performed by: EMERGENCY MEDICINE

## 2024-04-28 PROCEDURE — 99285 EMERGENCY DEPT VISIT HI MDM: CPT

## 2024-04-28 PROCEDURE — 80048 BASIC METABOLIC PNL TOTAL CA: CPT | Performed by: EMERGENCY MEDICINE

## 2024-04-28 PROCEDURE — 84484 ASSAY OF TROPONIN QUANT: CPT | Performed by: EMERGENCY MEDICINE

## 2024-04-28 PROCEDURE — 93010 ELECTROCARDIOGRAM REPORT: CPT

## 2024-04-28 RX ORDER — SULFAMETHOXAZOLE AND TRIMETHOPRIM 800; 160 MG/1; MG/1
1 TABLET ORAL 2 TIMES DAILY
Qty: 14 TABLET | Refills: 0 | Status: SHIPPED | OUTPATIENT
Start: 2024-04-28 | End: 2024-05-05

## 2024-04-28 RX ORDER — SULFAMETHOXAZOLE AND TRIMETHOPRIM 800; 160 MG/1; MG/1
1 TABLET ORAL ONCE
Status: COMPLETED | OUTPATIENT
Start: 2024-04-28 | End: 2024-04-28

## 2024-04-28 NOTE — ED INITIAL ASSESSMENT (HPI)
Pt to ED with fatigue, intermittent CP and dizziness with exertion. +nausea and chills. Denies URI sx. Symptoms ongoing x1 week.

## 2024-04-28 NOTE — ED PROVIDER NOTES
Patient Seen in: [facility] In [place]      History     Chief Complaint   Patient presents with    Weakness    Dizziness     Stated Complaint: weakness    Subjective:   HPI    33 yo male with c/o intermittent dizziness chest pain for one week now. After 12 hour shifts feels fatigue and dizzy especially with going from seated to standing posistion.  Reports that he drinks about 3 to 4 cups of water daily.    Objective:   Past Medical History:    Sarcoma (HCC)    right retroperitoneal mass              Past Surgical History:   Procedure Laterality Date    Gastro - dmg  11/16    normal to proximal jejunum    Other surgical history  02/27/2015    Resection of right retroperitoneal tumor,    Exclusion of small bowel using omentum.     Skin tissue procedure unlisted                  Social History     Socioeconomic History    Marital status: Single    Number of children: 2   Occupational History    Occupation: not employed   Tobacco Use    Smoking status: Every Day     Current packs/day: 1.00     Types: Cigarettes     Passive exposure: Current    Smokeless tobacco: Never    Tobacco comments:     quit Feb 2015   Vaping Use    Vaping status: Never Used   Substance and Sexual Activity    Alcohol use: No    Drug use: Yes     Types: Cannabis              Review of Systems    Positive for stated complaint: weakness  Other systems are as noted in HPI.  Constitutional and vital signs reviewed.      All other systems reviewed and negative except as noted above.    Physical Exam     ED Triage Vitals [04/28/24 0020]   /80   Pulse 93   Resp 20   Temp 98.4 °F (36.9 °C)   Temp src Oral   SpO2 95 %   O2 Device None (Room air)       Current:BP (!) 118/94   Pulse 66   Temp 98.4 °F (36.9 °C) (Oral)   Resp 16   Ht 185.4 cm (6' 1\")   Wt 127 kg   SpO2 98%   BMI 36.94 kg/m²         Physical Exam  Vitals and nursing note reviewed.   Constitutional:       Appearance: He is well-developed.   HENT:      Head:  Normocephalic and atraumatic.   Eyes:      Pupils: Pupils are equal, round, and reactive to light.   Cardiovascular:      Rate and Rhythm: Normal rate and regular rhythm.   Pulmonary:      Effort: Pulmonary effort is normal.      Breath sounds: Normal breath sounds.   Abdominal:      General: Bowel sounds are normal.      Palpations: Abdomen is soft.   Musculoskeletal:         General: No deformity.      Cervical back: Normal range of motion and neck supple.   Skin:     General: Skin is warm and dry.      Capillary Refill: Capillary refill takes less than 2 seconds.   Neurological:      Mental Status: He is alert and oriented to person, place, and time.         Lying: HR 71 /79  Sitting: HR 85 /77  Standing: HR 99 /80  ED Course     Labs Reviewed   URINALYSIS, ROUTINE - Abnormal; Notable for the following components:       Result Value    pH Urine 8.5 (*)     Leukocyte Esterase Urine Trace (*)     All other components within normal limits   UA MICROSCOPIC ONLY, URINE - Abnormal; Notable for the following components:    WBC Urine 21-50 (*)     Bacteria Urine 1+ (*)     Squamous Epi. Cells Few (*)     All other components within normal limits   BASIC METABOLIC PANEL (8) - Normal   TROPONIN I HIGH SENSITIVITY - Normal   D-DIMER - Normal   CBC WITH DIFFERENTIAL WITH PLATELET    Narrative:     The following orders were created for panel order CBC With Differential With Platelet.  Procedure                               Abnormality         Status                     ---------                               -----------         ------                     CBC W/ DIFFERENTIAL[[id number]]                              Final result                 Please view results for these tests on the individual orders.   URINE CULTURE, ROUTINE   CBC W/ DIFFERENTIAL     EKG    Rate, intervals and axes as noted on EKG Report.  Rate: 89  Rhythm: Sinus Rhythm  Reading: No gross ST elevation or depressions normal axis normal  intervals                 XR CHEST AP PORTABLE  (CPT=71045)    Result Date: 4/28/2024  CONCLUSION:  Patient rotated.  Heart size and pulmonary vascularity is unremarkable.  No focal infiltrate, consolidation, effusion or pneumothorax.   LOCATION:  [facility]      Dictated by (CST): Brenda Garcia MD on 4/28/2024 at 7:48 AM     Finalized by (CST): Brenda Garcia MD on 4/28/2024 at 7:49 AM       XR CHEST AP PORTABLE  (CPT=71045)    Result Date: 4/28/2024  CONCLUSION:  Patient rotated.  Heart size and pulmonary vascularity is unremarkable.  No focal infiltrate, consolidation, effusion or pneumothorax.   LOCATION:  [facility]      Dictated by (CST): Brenda Garcia MD on 4/28/2024 at 7:48 AM     Finalized by (CST): Brenda Garcia MD on 4/28/2024 at 7:49 AM             MDM      34-year-old male with complaints of dizziness and chest pain.  Vital signs stable arrival patient appears nontoxic examination orthostatic on examination however.  Patient was given IV fluids significant improvement.  CBC and electrolytes obtained grossly within normal limits urinalysis concerning for UTI will start on oral antibiotics.  EKG sinus no gross ST change significant ischemia chest x-ray clear no mediastinal effusions or infiltrates.  Discussed supportive care close follow-up strict return precautions.  Patient was understanding the plan and is in agreement plan discharged home in stable condition.                                   Medical Decision Making      Disposition and Plan     Clinical Impression:  1. Dizziness    2. Dehydration    3. Chest pain, atypical    4. Urinary tract infection without hematuria, site unspecified         Disposition:  Discharge  4/28/2024  4:21 am    Follow-up:  [facility], [address] [place] [address] [address] [place] [address]  [facility] [id number] [phone]  Call in 1 day(s)      [facility]  [address] [address] [place] [address]  [facility]  [id number] [phone]  Call in 1 day(s)            Medications Prescribed:  Discharge Medication List as of 4/28/2024  4:47 AM        START taking these medications    Details   sulfamethoxazole-trimethoprim -160 MG Oral Tab per tablet Take 1 tablet by mouth 2 (two) times daily for 7 days., Normal, Disp-14 tablet, R-0

## 2024-04-28 NOTE — DISCHARGE INSTRUCTIONS
Drink at least 80 ounces of fluids daily.  Please call cardiology and reschedule an appointment to be seen as soon as possible for your chest pain as well as her dizziness.  Please take the antibiotics as prescribed finish the entire course.  Return to the ER if your symptoms worsen or progress.

## 2025-03-28 ENCOUNTER — HOSPITAL ENCOUNTER (EMERGENCY)
Age: 35
Discharge: HOME | End: 2025-03-28
Attending: EMERGENCY MEDICINE
Payer: COMMERCIAL

## 2025-03-28 ENCOUNTER — APPOINTMENT (OUTPATIENT)
Dept: CT IMAGING | Age: 35
End: 2025-03-28
Attending: EMERGENCY MEDICINE
Payer: COMMERCIAL

## 2025-03-28 VITALS
BODY MASS INDEX: 35.94 KG/M2 | DIASTOLIC BLOOD PRESSURE: 67 MMHG | RESPIRATION RATE: 18 BRPM | WEIGHT: 280 LBS | OXYGEN SATURATION: 98 % | HEIGHT: 74 IN | SYSTOLIC BLOOD PRESSURE: 103 MMHG | TEMPERATURE: 98 F | HEART RATE: 88 BPM

## 2025-03-28 DIAGNOSIS — M54.6 CHRONIC LEFT-SIDED THORACIC BACK PAIN: Primary | ICD-10-CM

## 2025-03-28 DIAGNOSIS — G89.29 CHRONIC LEFT-SIDED THORACIC BACK PAIN: Primary | ICD-10-CM

## 2025-03-28 DIAGNOSIS — M54.13 RADICULOPATHY OF CERVICOTHORACIC REGION: ICD-10-CM

## 2025-03-28 LAB
ALBUMIN SERPL-MCNC: 4.7 G/DL (ref 3.2–4.8)
ALBUMIN/GLOB SERPL: 1.8 {RATIO} (ref 1–2)
ALP LIVER SERPL-CCNC: 78 U/L
ALT SERPL-CCNC: 47 U/L
ANION GAP SERPL CALC-SCNC: 5 MMOL/L (ref 0–18)
AST SERPL-CCNC: 30 U/L (ref ?–34)
BASOPHILS # BLD AUTO: 0.05 X10(3) UL (ref 0–0.2)
BASOPHILS NFR BLD AUTO: 0.8 %
BILIRUB SERPL-MCNC: 0.9 MG/DL (ref 0.3–1.2)
BUN BLD-MCNC: 10 MG/DL (ref 9–23)
CALCIUM BLD-MCNC: 10 MG/DL (ref 8.7–10.6)
CHLORIDE SERPL-SCNC: 104 MMOL/L (ref 98–112)
CO2 SERPL-SCNC: 30 MMOL/L (ref 21–32)
CREAT BLD-MCNC: 1.1 MG/DL
EGFRCR SERPLBLD CKD-EPI 2021: 89 ML/MIN/1.73M2 (ref 60–?)
EOSINOPHIL # BLD AUTO: 0.14 X10(3) UL (ref 0–0.7)
EOSINOPHIL NFR BLD AUTO: 2.2 %
ERYTHROCYTE [DISTWIDTH] IN BLOOD BY AUTOMATED COUNT: 12.2 %
GLOBULIN PLAS-MCNC: 2.6 G/DL (ref 2–3.5)
GLUCOSE BLD-MCNC: 108 MG/DL (ref 70–99)
HCT VFR BLD AUTO: 46.8 %
HGB BLD-MCNC: 16.1 G/DL
IMM GRANULOCYTES # BLD AUTO: 0.03 X10(3) UL (ref 0–1)
IMM GRANULOCYTES NFR BLD: 0.5 %
LYMPHOCYTES # BLD AUTO: 2.26 X10(3) UL (ref 1–4)
LYMPHOCYTES NFR BLD AUTO: 35.1 %
MCH RBC QN AUTO: 30.6 PG (ref 26–34)
MCHC RBC AUTO-ENTMCNC: 34.4 G/DL (ref 31–37)
MCV RBC AUTO: 88.8 FL
MONOCYTES # BLD AUTO: 0.63 X10(3) UL (ref 0.1–1)
MONOCYTES NFR BLD AUTO: 9.8 %
NEUTROPHILS # BLD AUTO: 3.32 X10 (3) UL (ref 1.5–7.7)
NEUTROPHILS # BLD AUTO: 3.32 X10(3) UL (ref 1.5–7.7)
NEUTROPHILS NFR BLD AUTO: 51.6 %
OSMOLALITY SERPL CALC.SUM OF ELEC: 288 MOSM/KG (ref 275–295)
PLATELET # BLD AUTO: 348 10(3)UL (ref 150–450)
POTASSIUM SERPL-SCNC: 3.9 MMOL/L (ref 3.5–5.1)
PROT SERPL-MCNC: 7.3 G/DL (ref 5.7–8.2)
RBC # BLD AUTO: 5.27 X10(6)UL
SODIUM SERPL-SCNC: 139 MMOL/L (ref 136–145)
WBC # BLD AUTO: 6.4 X10(3) UL (ref 4–11)

## 2025-03-28 PROCEDURE — 85025 COMPLETE CBC W/AUTO DIFF WBC: CPT | Performed by: EMERGENCY MEDICINE

## 2025-03-28 PROCEDURE — 99284 EMERGENCY DEPT VISIT MOD MDM: CPT

## 2025-03-28 PROCEDURE — 96374 THER/PROPH/DIAG INJ IV PUSH: CPT

## 2025-03-28 PROCEDURE — 74177 CT ABD & PELVIS W/CONTRAST: CPT | Performed by: EMERGENCY MEDICINE

## 2025-03-28 PROCEDURE — 80053 COMPREHEN METABOLIC PANEL: CPT | Performed by: EMERGENCY MEDICINE

## 2025-03-28 PROCEDURE — 71260 CT THORAX DX C+: CPT | Performed by: EMERGENCY MEDICINE

## 2025-03-28 PROCEDURE — 80053 COMPREHEN METABOLIC PANEL: CPT

## 2025-03-28 RX ORDER — KETOROLAC TROMETHAMINE 30 MG/ML
30 INJECTION, SOLUTION INTRAMUSCULAR; INTRAVENOUS ONCE
Status: COMPLETED | OUTPATIENT
Start: 2025-03-28 | End: 2025-03-28

## 2025-03-28 RX ORDER — MELOXICAM 7.5 MG/1
7.5 TABLET ORAL DAILY
Qty: 30 TABLET | Refills: 0 | Status: SHIPPED | OUTPATIENT
Start: 2025-03-28 | End: 2025-04-27

## 2025-03-28 RX ORDER — CYCLOBENZAPRINE HCL 10 MG
10 TABLET ORAL 3 TIMES DAILY PRN
Qty: 20 TABLET | Refills: 0 | Status: SHIPPED | OUTPATIENT
Start: 2025-03-28 | End: 2025-04-04

## 2025-03-28 NOTE — DISCHARGE INSTRUCTIONS
Please start meloxicam and you can use Flexeril which is a muscle relaxant occasionally to help with the pain please start daily morning and night stretching and increasing the strength of your core muscles.  Stopping throughout your workday and continuing to stretch could also help with the pain..  Thankfully the CT of your chest abdomen pelvis does not show recurrence of your sarcoma or any tumors or abnormal growths.  However, I cannot definitively rule out that there could be some pinched nerves in your back or arthritis or pulled muscles causing this pain.

## 2025-03-28 NOTE — ED PROVIDER NOTES
Patient Seen in: [facility] In [place]      History     Chief Complaint   Patient presents with    Pain     Stated Complaint: left flank, rib, shoulder pain, no injury    Subjective:   HPI  35-year-old male presents to the emergency department states that he has had increasing amounts of pain over the previous 5-month to make it difficult for him to lie down or sit certain positions he states that he has a pain that is across the left side of his back up his left shoulder pain with movement of his left arm and sometimes his left arm goes numb and pain in his left flank.  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  He is lying on emergency department bed any movement of his left arm is uncomfortable for him and sitting up and laying down into this while.  He states that he has been working during the day as a  he does try things with his left arm and seems to be fine it is just when he sits in certain positions it goes completely numb when he moves in certain ways.  He is unaware of any injury    Objective:     Past Medical History:    Sarcoma (HCC)    right retroperitoneal mass              Past Surgical History:   Procedure Laterality Date    Gastro - dmg  11/16    normal to proximal jejunum    Other surgical history  02/27/2015    Resection of right retroperitoneal tumor,    Exclusion of small bowel using omentum.     Skin tissue procedure unlisted                  Social History     Socioeconomic History    Marital status:     Number of children: 2   Occupational History    Occupation: not employed   Tobacco Use    Smoking status: Every Day     Current packs/day: 1.00      Types: Cigarettes     Passive exposure: Current    Smokeless tobacco: Never    Tobacco comments:     quit Feb 2015   Vaping Use    Vaping status: Never Used   Substance and Sexual Activity    Alcohol use: No    Drug use: Yes     Types: Cannabis               Works at [employer]  [facility] trying to provide for 5 kids  Smokes cannabis and a pack of cigarettes daily      Physical Exam     ED Triage Vitals [03/28/25 0622]   /90   Pulse 88   Resp 18   Temp 98.2 °F (36.8 °C)   Temp src Oral   SpO2 97 %   O2 Device None (Room air)       Current Vitals:   No data recorded      Physical Exam  Tearful uncomfortable gentleman lying on emergency department bed he appears significantly uncomfortable he has sclera are injected his oropharynx is benign his neck is nontender to palpation or with movement he is mildly obese his lungs are clear to auscultation bilaterally heart is regular rate and rhythm I do not elicit pain on palpation of his cervical spine or thoracic spine I did get pain over his thoracic back about the level of his CVA no visible bruising or swelling noted.  Upper and lower extremities are otherwise benign    ED Course     Labs Reviewed   COMP METABOLIC PANEL (14) - Abnormal; Notable for the following components:       Result Value    Glucose 108 (*)     All other components within normal limits   CBC WITH DIFFERENTIAL WITH PLATELET   RAINBOW DRAW LAVENDER   RAINBOW DRAW LIGHT GREEN   RAINBOW DRAW BLUE   RAINBOW DRAW GOLD            CT CHEST+ABDOMEN+PELVIS(ALL CNTRST ONLY)(CPT=71260/97231)   Final Result   PROCEDURE:  CT CHEST+ABDOMEN+PELVIS(ALL CNTRST ONLY)(CPT=71260/66511)       COMPARISON:  [facility], CT, CT ABDOMEN/PELVIS    KIDNEYSTONE WITH 3D (CPT=74176/09631), 1/11/2015, 1:31 PM.  [place] , CT,    CT ABDOMEN PELVIS IV CONTRAST, NO ORAL (ER), 9/06/2023, 1:45 PM.     [place], CT, CT CHEST (CPT=71250), 6/19/2020,    7:28 PM.       INDICATIONS:  left flank, rib,  shoulder pain, no injury, abdominal pain,    chest pain       TECHNIQUE:  IV contrast-enhanced scanning through the chest, abdomen, and    pelvis was performed.  Dose reduction techniques were used. Dose    information is transmitted to the ACR (American College of Radiology) NRDR    (National Radiology Data Registry) which    includes the Dose Index Registry.       PATIENT STATED HISTORY:(As transcribed by Technologist)  Patient has had    anterior and posterior left upper chest/shoulder pain for 5 months.         CONTRAST USED:  100cc of Isovue 370       FINDINGS:     LUNGS:  Minimal scattered atelectasis/scarring.  No lobar consolidation.     Minimal secretions in the right mainstem bronchus.   VASCULATURE:    Unremarkable    THORACIC AORTA:    No aneurysm or acute dissection.   MEDIASTINUM/KAILEE:    Unremarkable   CARDIAC:    No significant coronary artery calcifications.   PLEURA:    Unremarkable   CHEST WALL:    Unremarkable   LIVER:  Unremarkable.   BILIARY:  Status post cholecystectomy   SPLEEN:  Unremarkable.   PANCREAS:  Unremarkable.   ADRENALS:  Unremarkable.   KIDNEYS:  Unremarkable.   AORTA/VASCULAR:  Mild mixed plaque in the aorta and iliac arteries.   RETROPERITONEUM:  There are stable postoperative changes along the right    retroperitoneum with scattered surgical clips noted.  No new or    progressive mass identified.   BOWEL/MESENTERY:  Unremarkable appendix.  Scattered feces in the colon.     No large or small bowel dilatation.  No free air or free fluid.   ABDOMINAL WALL:  Scattered small fat containing ventral hernias.  Mild    diastasis of the rectus sheath.  Scarring from previous surgery.   PELVIC ORGANS:  Unremarkable urinary bladder.  Prostate calcifications.   LYMPH NODES:  No lymphadenopathy in the abdomen or pelvis.   BONES:  Degenerative changes in the spine   OTHER:  None.                             =====   CONCLUSION:         1. No acute abnormality identified in the chest, abdomen,  or pelvis.       2. Stable postoperative changes along the right retroperitoneum.  No    evidence of new or progressive mass.         Please see above for further details.       LOCATION:  [facility]               Dictated by (CST): Ifeanyi Welsh MD on 3/28/2025 at 8:34 AM        Finalized by (CST): Ifeanyi Welsh MD on 3/28/2025 at 8:42 AM                  MDM      Left arm numbness left flank pain left back pain could be cervical or cervicothoracic radiculopathy kidney stone I do not think that this represents a pulmonary embolus pain has been there for 5 months could represent musculoskeletal pain arthritic changes  Clearly patient's concern here is is that his sarcoma has returned.  He says that the pain is very similar he has not seen a doctor in more than 5 years to follow-up    Patient given Toradol had slight improvement in the pain, CT done, result reviewed, thankfully no sign of return of disease no kidney stone no specific reason for pain.  I do not think in any way that this represents cardiac pain given that it is very positional.  It could certainly be a cervical or thoracic radiculopathy but I am happy to be able to report that it is not return of his disease and it has been going on for 5 months so at this point I do think that meloxicam for anti-inflammatory relief and intermittent muscle relaxers for a few days may be helpful but he should really follow-up with a primary care physician for this chronic pain.  Labs are reassuring  Medical Decision Making      Disposition and Plan     Clinical Impression:  1. Chronic left-sided thoracic back pain    2. Radiculopathy of cervicothoracic region         Disposition:  Discharge  3/28/2025  9:33 am    Follow-up:  Louie Pimentel [place]  [address] [address] [address]  [phone]    Schedule an appointment as soon as possible for a visit            Medications Prescribed:  Discharge Medication List as of 3/28/2025  9:35 AM        START  taking these medications    Details   Meloxicam 7.5 MG Oral Tab Take 1 tablet (7.5 mg total) by mouth daily., Normal, Disp-30 tablet, R-0      cyclobenzaprine 10 MG Oral Tab Take 1 tablet (10 mg total) by mouth 3 (three) times daily as needed for Muscle spasms., Normal, Disp-20 tablet, R-0                 Supplementary Documentation:

## 2025-03-28 NOTE — ED INITIAL ASSESSMENT (HPI)
Left side/flank pain that radiates up to left shoulder. Worse with movement. Has been going on for 5 months. Was seen at an UC and given rx but pt states pain is progressivly getting worse. States the pain is very similar to the pain he had on his right side in 2015 when dx'd with Sarcoma

## 2025-04-09 RX ORDER — HYDROCODONE BITARTRATE AND ACETAMINOPHEN 5; 325 MG/1; MG/1
TABLET ORAL
Status: ON HOLD | COMMUNITY
Start: 2025-04-04 | End: 2025-04-11

## 2025-04-10 ENCOUNTER — ANESTHESIA EVENT (OUTPATIENT)
Dept: SURGERY | Facility: HOSPITAL | Age: 35
End: 2025-04-10
Payer: COMMERCIAL
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
## 2025-04-11 ENCOUNTER — APPOINTMENT (OUTPATIENT)
Dept: GENERAL RADIOLOGY | Facility: HOSPITAL | Age: 35
End: 2025-04-11
Attending: ORTHOPAEDIC SURGERY
Payer: COMMERCIAL

## 2025-04-11 ENCOUNTER — HOSPITAL ENCOUNTER (OUTPATIENT)
Facility: HOSPITAL | Age: 35
Setting detail: HOSPITAL OUTPATIENT SURGERY
Discharge: HOME | End: 2025-04-11
Attending: ORTHOPAEDIC SURGERY | Admitting: ORTHOPAEDIC SURGERY
Payer: COMMERCIAL

## 2025-04-11 ENCOUNTER — ANESTHESIA (OUTPATIENT)
Dept: SURGERY | Facility: HOSPITAL | Age: 35
End: 2025-04-11
Payer: COMMERCIAL

## 2025-04-11 VITALS
OXYGEN SATURATION: 93 % | DIASTOLIC BLOOD PRESSURE: 101 MMHG | BODY MASS INDEX: 31.57 KG/M2 | TEMPERATURE: 98 F | HEART RATE: 81 BPM | SYSTOLIC BLOOD PRESSURE: 142 MMHG | HEIGHT: 74 IN | RESPIRATION RATE: 18 BRPM | WEIGHT: 246 LBS

## 2025-04-11 DIAGNOSIS — S62.321A CLOSED DISPLACED FRACTURE OF SHAFT OF SECOND METACARPAL BONE OF LEFT HAND, INITIAL ENCOUNTER: Primary | ICD-10-CM

## 2025-04-11 PROCEDURE — 76000 FLUOROSCOPY <1 HR PHYS/QHP: CPT | Performed by: ORTHOPAEDIC SURGERY

## 2025-04-11 PROCEDURE — 76942 ECHO GUIDE FOR BIOPSY: CPT | Performed by: INTERNAL MEDICINE

## 2025-04-11 DEVICE — 2.0/2.3 TRILOCK PLATE 2/8 HOLE T, T1.3
Type: IMPLANTABLE DEVICE | Site: HAND | Status: FUNCTIONAL
Brand: APTUS

## 2025-04-11 DEVICE — 2.0 CORTICAL SCREW 17MM, HD6, 1/PKG
Type: IMPLANTABLE DEVICE | Site: HAND | Status: FUNCTIONAL
Brand: APTUS

## 2025-04-11 DEVICE — 2.0 TRILOCK SCREW 13MM, HD6, 1/PKG
Type: IMPLANTABLE DEVICE | Site: HAND | Status: FUNCTIONAL
Brand: APTUS

## 2025-04-11 DEVICE — 2.0 TRILOCK SCREW 18MM, HD6, 1/PKG
Type: IMPLANTABLE DEVICE | Site: HAND | Status: FUNCTIONAL
Brand: APTUS

## 2025-04-11 DEVICE — 2.0 TRILOCK SCREW 16MM, HD6, 1/PKG
Type: IMPLANTABLE DEVICE | Site: HAND | Status: FUNCTIONAL
Brand: APTUS

## 2025-04-11 DEVICE — 2.0/2.3 TRILOCK PL 8 HOLE STRAIGHT, T1.3
Type: IMPLANTABLE DEVICE | Site: HAND | Status: FUNCTIONAL
Brand: APTUS

## 2025-04-11 DEVICE — 2.0 CORTICAL SCREW 15MM, HD6, 1/PKG
Type: IMPLANTABLE DEVICE | Site: HAND | Status: FUNCTIONAL
Brand: APTUS

## 2025-04-11 DEVICE — 2.0 CORTICAL SCREW 09MM, HD6, 1/PKG
Type: IMPLANTABLE DEVICE | Site: HAND | Status: FUNCTIONAL
Brand: APTUS

## 2025-04-11 DEVICE — 2.0 CORTICAL SCREW 13MM, HD6, 1/PKG
Type: IMPLANTABLE DEVICE | Site: HAND | Status: FUNCTIONAL
Brand: APTUS

## 2025-04-11 DEVICE — 2.0 TRILOCK SCREW 14MM, HD6, 1/PKG
Type: IMPLANTABLE DEVICE | Site: HAND | Status: FUNCTIONAL
Brand: APTUS

## 2025-04-11 RX ORDER — ONDANSETRON 2 MG/ML
4 INJECTION INTRAMUSCULAR; INTRAVENOUS EVERY 6 HOURS PRN
Status: DISCONTINUED | OUTPATIENT
Start: 2025-04-11 | End: 2025-04-11

## 2025-04-11 RX ORDER — LIDOCAINE HYDROCHLORIDE 10 MG/ML
INJECTION, SOLUTION EPIDURAL; INFILTRATION; INTRACAUDAL; PERINEURAL AS NEEDED
Status: DISCONTINUED | OUTPATIENT
Start: 2025-04-11 | End: 2025-04-11 | Stop reason: SURG

## 2025-04-11 RX ORDER — HYDROMORPHONE HYDROCHLORIDE 1 MG/ML
0.4 INJECTION, SOLUTION INTRAMUSCULAR; INTRAVENOUS; SUBCUTANEOUS EVERY 5 MIN PRN
Status: DISCONTINUED | OUTPATIENT
Start: 2025-04-11 | End: 2025-04-11

## 2025-04-11 RX ORDER — BUPIVACAINE HYDROCHLORIDE 5 MG/ML
INJECTION, SOLUTION EPIDURAL; INTRACAUDAL; PERINEURAL AS NEEDED
Status: DISCONTINUED | OUTPATIENT
Start: 2025-04-11 | End: 2025-04-11 | Stop reason: SURG

## 2025-04-11 RX ORDER — OXYCODONE HYDROCHLORIDE 5 MG/1
5 TABLET ORAL EVERY 4 HOURS PRN
Refills: 0 | Status: DISCONTINUED | OUTPATIENT
Start: 2025-04-11 | End: 2025-04-11

## 2025-04-11 RX ORDER — DEXAMETHASONE SODIUM PHOSPHATE 10 MG/ML
INJECTION, SOLUTION INTRAMUSCULAR; INTRAVENOUS AS NEEDED
Status: DISCONTINUED | OUTPATIENT
Start: 2025-04-11 | End: 2025-04-11 | Stop reason: SURG

## 2025-04-11 RX ORDER — OXYCODONE HYDROCHLORIDE 5 MG/1
15 TABLET ORAL EVERY 4 HOURS PRN
Refills: 0 | Status: DISCONTINUED | OUTPATIENT
Start: 2025-04-11 | End: 2025-04-11

## 2025-04-11 RX ORDER — ACETAMINOPHEN 10 MG/ML
INJECTION, SOLUTION INTRAVENOUS
Status: COMPLETED
Start: 2025-04-11 | End: 2025-04-11

## 2025-04-11 RX ORDER — ACETAMINOPHEN 500 MG
1000 TABLET ORAL ONCE AS NEEDED
Status: DISCONTINUED | OUTPATIENT
Start: 2025-04-11 | End: 2025-04-11

## 2025-04-11 RX ORDER — KETOROLAC TROMETHAMINE 30 MG/ML
INJECTION, SOLUTION INTRAMUSCULAR; INTRAVENOUS AS NEEDED
Status: DISCONTINUED | OUTPATIENT
Start: 2025-04-11 | End: 2025-04-11 | Stop reason: SURG

## 2025-04-11 RX ORDER — PROCHLORPERAZINE EDISYLATE 5 MG/ML
5 INJECTION INTRAMUSCULAR; INTRAVENOUS EVERY 8 HOURS PRN
Status: DISCONTINUED | OUTPATIENT
Start: 2025-04-11 | End: 2025-04-11

## 2025-04-11 RX ORDER — SODIUM CHLORIDE, SODIUM LACTATE, POTASSIUM CHLORIDE, CALCIUM CHLORIDE 600; 310; 30; 20 MG/100ML; MG/100ML; MG/100ML; MG/100ML
INJECTION, SOLUTION INTRAVENOUS CONTINUOUS
Status: DISCONTINUED | OUTPATIENT
Start: 2025-04-11 | End: 2025-04-11

## 2025-04-11 RX ORDER — DEXAMETHASONE SODIUM PHOSPHATE 4 MG/ML
VIAL (ML) INJECTION AS NEEDED
Status: DISCONTINUED | OUTPATIENT
Start: 2025-04-11 | End: 2025-04-11 | Stop reason: SURG

## 2025-04-11 RX ORDER — HYDROMORPHONE HYDROCHLORIDE 1 MG/ML
0.2 INJECTION, SOLUTION INTRAMUSCULAR; INTRAVENOUS; SUBCUTANEOUS EVERY 5 MIN PRN
Status: DISCONTINUED | OUTPATIENT
Start: 2025-04-11 | End: 2025-04-11

## 2025-04-11 RX ORDER — ACETAMINOPHEN 10 MG/ML
1000 INJECTION, SOLUTION INTRAVENOUS ONCE
Status: COMPLETED | OUTPATIENT
Start: 2025-04-11 | End: 2025-04-11

## 2025-04-11 RX ORDER — MEPERIDINE HYDROCHLORIDE 25 MG/ML
12.5 INJECTION INTRAMUSCULAR; INTRAVENOUS; SUBCUTANEOUS AS NEEDED
Status: DISCONTINUED | OUTPATIENT
Start: 2025-04-11 | End: 2025-04-11

## 2025-04-11 RX ORDER — NALOXONE HYDROCHLORIDE 0.4 MG/ML
0.08 INJECTION, SOLUTION INTRAMUSCULAR; INTRAVENOUS; SUBCUTANEOUS AS NEEDED
Status: DISCONTINUED | OUTPATIENT
Start: 2025-04-11 | End: 2025-04-11

## 2025-04-11 RX ORDER — LABETALOL HYDROCHLORIDE 5 MG/ML
INJECTION, SOLUTION INTRAVENOUS AS NEEDED
Status: DISCONTINUED | OUTPATIENT
Start: 2025-04-11 | End: 2025-04-11 | Stop reason: SURG

## 2025-04-11 RX ORDER — MIDAZOLAM HYDROCHLORIDE 1 MG/ML
INJECTION INTRAMUSCULAR; INTRAVENOUS AS NEEDED
Status: DISCONTINUED | OUTPATIENT
Start: 2025-04-11 | End: 2025-04-11 | Stop reason: SURG

## 2025-04-11 RX ORDER — HYDROCODONE BITARTRATE AND ACETAMINOPHEN 5; 325 MG/1; MG/1
1-2 TABLET ORAL EVERY 6 HOURS PRN
Qty: 20 TABLET | Refills: 0 | Status: SHIPPED | OUTPATIENT
Start: 2025-04-11

## 2025-04-11 RX ORDER — ONDANSETRON 2 MG/ML
INJECTION INTRAMUSCULAR; INTRAVENOUS AS NEEDED
Status: DISCONTINUED | OUTPATIENT
Start: 2025-04-11 | End: 2025-04-11 | Stop reason: SURG

## 2025-04-11 RX ORDER — LABETALOL HYDROCHLORIDE 5 MG/ML
10 INJECTION, SOLUTION INTRAVENOUS EVERY 5 MIN PRN
Status: DISCONTINUED | OUTPATIENT
Start: 2025-04-11 | End: 2025-04-11

## 2025-04-11 RX ORDER — OXYCODONE HYDROCHLORIDE 5 MG/1
10 TABLET ORAL EVERY 4 HOURS PRN
Refills: 0 | Status: DISCONTINUED | OUTPATIENT
Start: 2025-04-11 | End: 2025-04-11

## 2025-04-11 RX ORDER — HYDROMORPHONE HYDROCHLORIDE 1 MG/ML
0.6 INJECTION, SOLUTION INTRAMUSCULAR; INTRAVENOUS; SUBCUTANEOUS EVERY 5 MIN PRN
Status: DISCONTINUED | OUTPATIENT
Start: 2025-04-11 | End: 2025-04-11

## 2025-04-11 RX ORDER — ACETAMINOPHEN 500 MG
1000 TABLET ORAL ONCE
Status: DISCONTINUED | OUTPATIENT
Start: 2025-04-11 | End: 2025-04-11 | Stop reason: HOSPADM

## 2025-04-11 RX ORDER — CEFAZOLIN SODIUM 1 G/3ML
INJECTION, POWDER, FOR SOLUTION INTRAMUSCULAR; INTRAVENOUS AS NEEDED
Status: DISCONTINUED | OUTPATIENT
Start: 2025-04-11 | End: 2025-04-11 | Stop reason: SURG

## 2025-04-11 RX ORDER — MELOXICAM 15 MG/1
7.5 TABLET ORAL DAILY
COMMUNITY

## 2025-04-11 RX ORDER — SCOPOLAMINE 1 MG/3D
1 PATCH, EXTENDED RELEASE TRANSDERMAL ONCE
Status: DISCONTINUED | OUTPATIENT
Start: 2025-04-11 | End: 2025-04-11 | Stop reason: HOSPADM

## 2025-04-11 RX ORDER — METHOCARBAMOL 100 MG/ML
1000 INJECTION, SOLUTION INTRAMUSCULAR; INTRAVENOUS ONCE
Status: DISCONTINUED | OUTPATIENT
Start: 2025-04-11 | End: 2025-04-11

## 2025-04-11 RX ORDER — HYDROMORPHONE HYDROCHLORIDE 1 MG/ML
INJECTION, SOLUTION INTRAMUSCULAR; INTRAVENOUS; SUBCUTANEOUS
Status: COMPLETED
Start: 2025-04-11 | End: 2025-04-11

## 2025-04-11 RX ADMIN — KETOROLAC TROMETHAMINE 30 MG: 30 INJECTION, SOLUTION INTRAMUSCULAR; INTRAVENOUS at 17:32:00

## 2025-04-11 RX ADMIN — SODIUM CHLORIDE, SODIUM LACTATE, POTASSIUM CHLORIDE, CALCIUM CHLORIDE: 600; 310; 30; 20 INJECTION, SOLUTION INTRAVENOUS at 15:30:00

## 2025-04-11 RX ADMIN — MIDAZOLAM HYDROCHLORIDE 2 MG: 1 INJECTION INTRAMUSCULAR; INTRAVENOUS at 15:35:00

## 2025-04-11 RX ADMIN — DEXAMETHASONE SODIUM PHOSPHATE 2 MG: 10 INJECTION, SOLUTION INTRAMUSCULAR; INTRAVENOUS at 15:41:00

## 2025-04-11 RX ADMIN — LABETALOL HYDROCHLORIDE 10 MG: 5 INJECTION, SOLUTION INTRAVENOUS at 17:08:00

## 2025-04-11 RX ADMIN — LIDOCAINE HYDROCHLORIDE 100 MG: 10 INJECTION, SOLUTION EPIDURAL; INFILTRATION; INTRACAUDAL; PERINEURAL at 15:46:00

## 2025-04-11 RX ADMIN — ONDANSETRON 4 MG: 2 INJECTION INTRAMUSCULAR; INTRAVENOUS at 17:30:00

## 2025-04-11 RX ADMIN — CEFAZOLIN SODIUM 2 G: 1 INJECTION, POWDER, FOR SOLUTION INTRAMUSCULAR; INTRAVENOUS at 15:50:00

## 2025-04-11 RX ADMIN — DEXAMETHASONE SODIUM PHOSPHATE 8 MG: 4 MG/ML VIAL (ML) INJECTION at 15:53:00

## 2025-04-11 RX ADMIN — BUPIVACAINE HYDROCHLORIDE 20 ML: 5 INJECTION, SOLUTION EPIDURAL; INTRACAUDAL; PERINEURAL at 15:41:00

## 2025-04-11 RX ADMIN — ONDANSETRON 4 MG: 2 INJECTION INTRAMUSCULAR; INTRAVENOUS at 15:47:00

## 2025-04-11 NOTE — ANESTHESIA PROCEDURE NOTES
Airway  Date/Time: 4/11/2025 3:47 PM  Reason: elective    Airway not difficult    General Information and Staff   Patient location during procedure: OR  Anesthesiologist: Darryl Castro DO  Performed: anesthesiologist   Performed by: Darryl Castro DO  Authorized by: Darryl Castro DO        Indications and Patient Condition  Indications for airway management: anesthesia  Sedation level: deep      Preoxygenated: yesPatient position: sniffing    Mask difficulty assessment: 1 - vent by mask    Final Airway Details    Final airway type: supraglottic airway      Successful airway: classic  Size: 4     Number of attempts at approach: 1  Number of other approaches attempted: 0

## 2025-04-11 NOTE — ANESTHESIA POSTPROCEDURE EVALUATION
[facility]    Kayla Fleming Patient Status:  Hospital Outpatient Surgery   Age/Gender 35 year old male MRN [id number]   Location [facility] SURGERY Attending Jt Lee MD   Hosp Day # 0 PCP None Pcp       Anesthesia Post-op Note    OPEN REDUCTION INTERNAL FIXATION LEFT INDEX AND MIDDLE FINGER METACARPALS    Procedure Summary       Date: 04/11/25 Room / Location:  MAIN OR 04 / [facility] MAIN OR    Anesthesia Start: 1527 Anesthesia Stop: 1835    Procedure: OPEN REDUCTION INTERNAL FIXATION LEFT INDEX AND MIDDLE FINGER METACARPALS (Left: Hand) Diagnosis: (CLOSED DISPLACED FRACTURE OF SHAFT OF SECOND METACARPAL BONE OF LEFT HAND, INITIAL ENCOUNTER, CLOSED DISPLACED FRACTURE OF BASE OF THIRD METACARPAL BONE OF LEFT HAND, INITIAL ENCOUNTER)    Surgeons: Jt Lee MD Anesthesiologist: Darryl Castro DO    Anesthesia Type: general ASA Status: 2            Anesthesia Type: general    Vitals Value Taken Time   /105 04/11/25 18:35   Temp 97.1 04/11/25 18:35   Pulse 94 04/11/25 18:35   Resp 27 04/11/25 18:35   SpO2 95 04/11/25 18:35           Patient Location: PACU    Anesthesia Type: general    Airway Patency: patent    Postop Pain Control: adequate    Mental Status: preanesthetic baseline    Nausea/Vomiting: none    Cardiopulmonary/Hydration status: stable euvolemic    Complications: no apparent anesthesia related complications    Postop vital signs: stable    Dental Exam: Unchanged from Preop    Patient to be discharged from PACU when criteria met.

## 2025-04-11 NOTE — BRIEF OP NOTE
Pre-Operative Diagnosis: CLOSED DISPLACED FRACTURE OF SHAFT OF SECOND METACARPAL BONE OF LEFT HAND, INITIAL ENCOUNTER, CLOSED DISPLACED FRACTURE OF BASE OF THIRD METACARPAL BONE OF LEFT HAND, INITIAL ENCOUNTER     Post-Operative Diagnosis: CLOSED DISPLACED FRACTURE OF SHAFT OF SECOND METACARPAL BONE OF LEFT HAND, INITIAL ENCOUNTER, CLOSED DISPLACED FRACTURE OF BASE OF THIRD METACARPAL BONE OF LEFT HAND, INITIAL ENCOUNTER      Procedure Performed:   OPEN REDUCTION INTERNAL FIXATION LEFT INDEX AND MIDDLE FINGER METACARPALS    Surgeons and Role:     * Jt Lee MD - Primary    Assistant(s):  PA: Stella Almaguer PA-C     Surgical Findings:       Specimen: none     Estimated Blood Loss: Blood Output: 5 mL (4/11/2025  5:27 PM)      Dictation Number:       Jt Lee MD  4/11/2025  5:46 PM

## 2025-04-11 NOTE — ANESTHESIA PREPROCEDURE EVALUATION
PRE-OP EVALUATION    Patient Name: Kayla Fleming    Admit Diagnosis: CLOSED DISPLACED FRACTURE OF SHAFT OF SECOND METACARPAL BONE OF LEFT HAND, INITIAL ENCOUNTER, CLOSED DISPLACED FRACTURE OF BASE OF THIRD METACARPAL BONE OF LEFT HAND, INITIAL ENCOUNTER    Pre-op Diagnosis: CLOSED DISPLACED FRACTURE OF SHAFT OF SECOND METACARPAL BONE OF LEFT HAND, INITIAL ENCOUNTER, CLOSED DISPLACED FRACTURE OF BASE OF THIRD METACARPAL BONE OF LEFT HAND, INITIAL ENCOUNTER    OPEN REDUCTION INTERNAL FIXATION LEFT INDEX AND MIDDLE FINGER METACARPALS    Anesthesia Procedure: OPEN REDUCTION INTERNAL FIXATION LEFT INDEX AND MIDDLE FINGER METACARPALS (Left: Hand)    Surgeons and Role:     * Jt Lee MD - Primary    Pre-op vitals reviewed.  Temp: 98 °F (36.7 °C)  Pulse: 61  Resp: 16  BP: 128/97  SpO2: 98 %  Body mass index is 31.58 kg/m².    Current medications reviewed.  Hospital Medications:  Current Medications[1]    Outpatient Medications:   Prescriptions Prior to Admission[2]    Allergies: Patient has no known allergies.      Anesthesia Evaluation        Anesthetic Complications  (-) history of anesthetic complications         GI/Hepatic/Renal    Negative GI/hepatic/renal ROS.         (-) chronic renal disease   (-) liver disease                 Cardiovascular    Negative cardiovascular ROS.                     (-) past MI                (-) angina              Endo/Other    Negative endo/other ROS.  (-) diabetes     (-) hypothyroidism  (-) hyperthyroidism                     Pulmonary      (-) asthma  (-) COPD       (-) shortness of breath            Neuro/Psych    Negative neuro/psych ROS.      (-) CVA   (-) TIA  (-) seizures                       Past Surgical History[3]  Social Hx on file[4]  History   Drug Use Unknown     Available pre-op labs reviewed.  Lab Results   Component Value Date    WBC 6.4 03/28/2025    RBC 5.27 03/28/2025    HGB 16.1 03/28/2025    HCT 46.8 03/28/2025    MCV 88.8 03/28/2025    MCH 30.6  03/28/2025    MCHC 34.4 03/28/2025    RDW 12.2 03/28/2025    .0 03/28/2025     Lab Results   Component Value Date     03/28/2025    K 3.9 03/28/2025     03/28/2025    CO2 30.0 03/28/2025    BUN 10 03/28/2025    CREATSERUM 1.10 03/28/2025     (H) 03/28/2025    CA 10.0 03/28/2025            Airway      Mallampati: III  Mouth opening: >3 FB  TM distance: > 6 cm  Neck ROM: full Cardiovascular    Cardiovascular exam normal.         Dental    Dentition appears grossly intact         Pulmonary    Pulmonary exam normal.                 Other findings              ASA: 2   Plan: general  NPO status verified and patient meets guidelines.        Comment: I spoke with the patient and discussed the risks of general anesthesia, which include nausea and vomiting; sore throat; injury to the lips, gums, teeth, and eyes; cardiac, pulmonary, and neurologic events; aspiration; and allergic reactions. The patient understands these risks and consents to receiving general anesthesia for this procedure.    Plan/risks discussed with: patient and spouse                Present on Admission:  **None**             [1]    lactated ringers infusion   Intravenous Continuous   [2]   Medications Prior to Admission   Medication Sig Dispense Refill Last Dose/Taking    Meloxicam 15 MG Oral Tab Take 0.5 tablets (7.5 mg total) by mouth daily.   4/4/2025    HYDROcodone-acetaminophen 5-325 MG Oral Tab    4/9/2025   [3]   Past Surgical History:  Procedure Laterality Date    Gastro - dmg  11/16    normal to proximal jejunum    Other surgical history  02/27/2015    Resection of right retroperitoneal tumor,    Exclusion of small bowel using omentum.     Skin tissue procedure unlisted     [4]   Social History  Socioeconomic History    Marital status:     Number of children: 2   Occupational History    Occupation: not employed   Tobacco Use    Smoking status: Every Day     Current packs/day: 1.00     Types: Cigarettes     Passive  exposure: Current    Smokeless tobacco: Never    Tobacco comments:     quit Feb 2015   Vaping Use    Vaping status: Never Used   Substance and Sexual Activity    Alcohol use: No    Drug use: Not Currently     Types: Cannabis

## 2025-04-11 NOTE — ANESTHESIA PROCEDURE NOTES
Regional Block    Date/Time: 4/11/2025 3:35 PM    Performed by: Darryl Castro DO  Authorized by: Darryl Castro DO      General Information and Staff    Start Time:  4/11/2025 3:35 PM  End Time:  4/11/2025 3:41 PM  Anesthesiologist:  Darryl Castro DO  Performed by:  Anesthesiologist  Patient Location:  OR    Block Placement: Pre Induction  Site Identification: real time ultrasound guided and image stored and retrievable    Block site/laterality marked before start: site marked  Reason for Block: at surgeon's request and post-op pain management    Preanesthetic Checklist: 2 patient identifers, IV checked, site marked, risks and benefits discussed, monitors and equipment checked, pre-op evaluation, timeout performed, anesthesia consent, sterile technique used, no prohibitive neurological deficits and no local skin infection at insertion site      Procedure Details    Patient Position:  Supine  Prep: ChloraPrep    Monitoring:  Cardiac monitor, continuous pulse ox, blood pressure cuff and heart rate  Block Type:  Supraclavicular  Laterality:  Left  Injection Technique:  Single-shot    Needle    Needle Type:  Short-bevel and echogenic  Needle Gauge:  21 G  Needle Length:  100 mm  Needle Localization:  Ultrasound guidance  Reason for Ultrasound Use: appropriate spread of the medication was noted in real time and no ultrasound evidence of intravascular and/or intraneural injection            Assessment    Injection Assessment:  Good spread noted, negative resistance, negative aspiration for heme, incremental injection and low pressure  Heart Rate Change: No    - Patient tolerated block procedure well without evidence of immediate block related complications.     Medications  4/11/2025 3:35 PM      Additional Comments    Medication:  Bupivacaine 0.5% 20mL

## 2025-04-11 NOTE — INTERVAL H&P NOTE
Pre-op Diagnosis: CLOSED DISPLACED FRACTURE OF SHAFT OF SECOND METACARPAL BONE OF LEFT HAND, INITIAL ENCOUNTER, CLOSED DISPLACED FRACTURE OF BASE OF THIRD METACARPAL BONE OF LEFT HAND, INITIAL ENCOUNTER    The above referenced H&P was reviewed by Jt Lee MD on 4/11/2025, the patient was examined and no significant changes have occurred in the patient's condition since the H&P was performed.  I discussed with the patient and/or legal representative the potential benefits, risks and side effects of this procedure; the likelihood of the patient achieving goals; and potential problems that might occur during recuperation.  I discussed reasonable alternatives to the procedure, including risks, benefits and side effects related to the alternatives and risks related to not receiving this procedure.  We will proceed with procedure as planned.

## 2025-04-11 NOTE — DISCHARGE INSTRUCTIONS
Home Care Instructions Following Your Fracture Repair     Kayla-  We hope you were pleased with your care at [facility].  We wish you the best outcome and overall experience with your operation.  These instructions will help to minimize pain, limit the risk of infection, and improve the likelihood of a successful result.    Splint  Your splint will help protect the repair and healing of your fracture.  Keep your splint on at all times  Do not remove your splint  Do not get your splint wet.  Keep your splint clean and dry.  Wear splint when sleeping.    Cold Therapy  Cold therapy will help minimize swelling, improve, comfort, and limit the need for pain medication.  Apply a bag of ice wrapped in a towel for 20 minutes three times daily  An ice bag should never come in direct contact with the skin.  Immediately contact Dr. Lee, if you experience excruciating pain not helped by pain medication, burning, blisters, redness, discoloration, or other changes in skin appearance    Hand Elevation  Strict hand elevation will help to minimize pain, improve healing, and decrease the risk of infection.  Keep hand elevated on two pillows when sitting or lying  Maintain hand above the level of your heart as much as possible  Keep hand at shoulder level during the day.  If your hand starts to throb, hurt, and swell, it might be a sign that you are not elevating it.    Pain Medication: Norco or Tylenol #3  Dr. Lee will prescribe either Norco or Tylenol #3 for your pain.  Norco or Tylenol #3: Take one or two tablets every four hours as needed for pain.  Do not exceed 8 (eight) tablets each day  Do not take Norco/Tylenol #3, if you do not have pain.    Over-The-Counter Medication  Non-prescription anti-inflammatory medications can also help to ease the pain.  You can take Aleve or ibuprofen   Take as directed on the bottle  Drink a full glass of water with the medication    Home Medication  Resume your home medications as  instructed    Diet  Resume your normal diet    Activity  No strenuous activity  You can go up and down the stairs as tolerated.  Use common sense  You cannot return to work, if your work requires strenuous activity with your arms or hands.  You cannot return to sports or physical exercise until you have received medical permission.  Return to Work or School  You can return to work in 2-3, if your work does not involve strenuous activity with your arms or hands  You can return to school in 2-3 days but not gym class for 4-6 weeks.    Driving  Do not drive, if you are wearing a splint and/or taking pain medication.    Follow-up Appointment With The Hand Therapist (Occupational Therapist)  An appointment with the hand therapist was arranged for 3-5 days after your operation at the time your procedure was scheduled.  Call Dr. Lee's office today for an appointment with the hand therapist in 3-5 days, if you cannot remember it.    Verify your appointment date, day, time, and location.  At your 1st postoperative office visit:  Your splint will be removed and the wounds will be evaluated, and any additional concerns and instructions will be discussed.    Follow-up Appointment with Dr. Lee  Call Dr. Lee's office today for an appointment in 10-14 days.   Verify your appointment date, day, time, and location.  At your 1st postoperative office visit:  Your wounds will be evaluated, healing assessed, and any additional concerns and instructions will be discussed.    Questions or Concerns  Call Dr. Lee's office if you experience severe pain not controlled by pain medication, swelling, numbness, tingling, bleeding, fever, or other concerns If your call is made after office hours, a physician's assistant or nurse practitioner will be available to help you.  There is always a surgeon covering Dr. Lee's patients, if he is unavailable.    Kayla  Thank you for coming to [facility] for your operation.  The nurses and  the anesthesiologist try very hard to make sure you receive the best care possible.  Your trust in them is greatly appreciated.    Thanks so much,  Dr. Lee        You received a drug called Toradol which is an Anti Inflammatory at:  5:30pm  If you are allowed to take Anti inflammatories:    Do not take any Anti Inflammatory like Motrin, Aleve or Ibuprophen until after:  11:30pm  Please report any suspected allergic reactions or bleeding issues to your doctor     1000mg of tylenol was given at 7:00pm    1 oxycodone given at 8:30pm

## 2025-04-14 NOTE — OPERATIVE REPORT
[facility]    PATIENT'S NAME: BLACK ORELLANA   ATTENDING PHYSICIAN: Jt Lee M.D.   OPERATING PHYSICIAN: Jt Lee M.D.   PATIENT ACCOUNT#:   [id number]    LOCATION:  [facility] OR [facility]  MEDICAL RECORD #:   [id number]       YOB: 1990  ADMISSION DATE:       04/11/2025      OPERATION DATE:  04/11/2025    OPERATIVE REPORT    PREOPERATIVE DIAGNOSIS:  Fracture of the left index and middle finger metacarpals.  POSTOPERATIVE DIAGNOSIS:  Fracture of the left index and middle finger metacarpals.  PROCEDURE:  Open reduction and internal fixation of left index and middle finger metacarpals.    ASSISTANT:  Stella Almaguer PA-C.    ANESTHESIA:  General with a preoperative regional block.    OPERATIVE TECHNIQUE:  After induction of anesthesia, the arm was prepped and draped in sterile fashion.  Esmarch exsanguination performed.  Tourniquet elevated to 250 mmHg.  Incision was made at the second web space region, and the index finger was approached first.  A subperiosteal dissection was performed, and we were able to de-rotate the fracture and hold it provisionally with a clamp.  A lag screw was placed with a 2 mm screw over drilling proximally.  A [employer] dorsal plate was then positioned and a combination of locking and nonlocking screws was used to bridge the fracture site.  The rotatory deformity had been corrected.    The juncturae tendinae were divided allowing the middle finger metacarpal to be exposed subperiosteally.  There was a large butterfly fragment dorsally and, again, with Ms. Almaguer providing retraction and exposing, I was able to place a T plate over the dorsal aspect of the metacarpal and, using the plate, I was able to lag the dorsal fragment to the other fragments.  After that, I was able to use locking screws for the remainder of the holes.  AP, lateral, and oblique views showed good position of the fracture and hardware.  Fingers came down in the normal  pattern with no rotatory deformity.  Wounds were copiously irrigated, and the periosteum was repaired with 4-0 Vicryl.  The juncturae tendinae were repaired with 4-0 Vicryl.  Skin was again copiously irrigated for an additional time and closed with Vicryl and running 4-0 nylon.  Dressings and a radial gutter splint were applied.  The patient was awoken and taken to postanesthesia recovery room in stable condition.  He had been placed in a sling to use until the block wears off.  No complications.  Blood loss minimal.  No specimens.    Dictated By Jt Lee M.D.  d: 04/11/2025 17:54:07  t: 04/11/2025 18:14:03  Job [id number]  RLW/

## (undated) DEVICE — 2.0 CORTICAL SCREW 08MM, HD6, 1/PKG
Type: IMPLANTABLE DEVICE | Site: HAND | Status: NON-FUNCTIONAL
Brand: APTUS
Removed: 2025-04-11

## (undated) DEVICE — STANDARD HYPODERMIC NEEDLE,POLYPROPYLENE HUB: Brand: MONOJECT

## (undated) DEVICE — SMOKE EVACUATION PENCIL: Brand: VALLEYLAB

## (undated) DEVICE — SUT ETHLN 5-0 18IN PS-3 NABSRB BLK L16MM 3/8

## (undated) DEVICE — DISPOSABLE TOURNIQUET CUFF SINGLE BLADDER, DUAL PORT AND QUICK CONNECT CONNECTOR: Brand: COLOR CUFF

## (undated) DEVICE — GLOVE SUR 8 SENSICARE PI PIP CRM PWD F

## (undated) DEVICE — SUT COAT VCRL 2-0 27IN SH ABSRB UD 26MM 1/2

## (undated) DEVICE — TWIST DRILL Ø1.6MM X 25MM, L81MM, AO: Brand: APTUS

## (undated) DEVICE — GLOVE SUR 7.5 SENSICARE PI PIP CRM PWD F

## (undated) DEVICE — DRAPE,TOWEL,LARGE,INVISISHIELD: Brand: MEDLINE

## (undated) DEVICE — GOWN,SIRUS,FABRNF,XL,20/CS: Brand: MEDLINE

## (undated) DEVICE — UPPER EXTREMITY CDS-LF: Brand: MEDLINE INDUSTRIES, INC.

## (undated) DEVICE — SLING SHLDR XL ULT SFT STRP ESSENTIAL

## (undated) DEVICE — UNDYED BRAIDED (POLYGLACTIN 910), SYNTHETIC ABSORBABLE SUTURE: Brand: COATED VICRYL

## (undated) DEVICE — SLEEVE COMPR MD KNEE LEN SGL USE KENDALL SCD

## (undated) DEVICE — DISPOSABLE BIPOLAR FORCEPS 4" (10.2CM) JEWELERS, STRAIGHT 0.4MM TIP AND 12 FT. (3.6M) CABLE: Brand: KIRWAN

## (undated) DEVICE — SUT ETHLN 4-0 18IN NABSRB BLK 19MM PS

## (undated) DEVICE — SOLUTION IRRIG 1000ML 0.9% NACL USP BTL

## (undated) NOTE — ED AVS SNAPSHOT
[facility] Emergency Department    [place]  [address] [address]    Phone:  [phone]    Fax:  [address] [facility]   MRN: [id number]    Department:  [facility] Emergency Department   Date of Visit:  5/1 Take 1 tablet (4 mg total) by mouth every 8 (eight) hours as needed for Nausea.             Where to Get Your Medications      You can get these medications from any pharmacy     Bring a paper prescription for each of these medications    - famoTIDine 20 MG return to your personal doctor) about any new or lasting problems. The primary care or specialist physician will see patients referred from the [facility] Emergency Department. Follow-up care is at the discretion of that Physician.     IF THERE IS ANY - If you have concerns related to behavioral health issues or thoughts of harming yourself, contact [address] [place] [address] at [phone].     - If you don’t have insurance, [facility] has partnered with [employer] Final result    Impression:    CONCLUSION:       1. No evidence of pulmonary embolus. 2. No evidence of an acute inflammatory process in the abdomen or pelvis.      3. Ventral abdominal periumbilical hernia that contains loops of small bowel is sta Lung bases are unremarkable. No pleural or pericardial effusions  No lesions in the liver, spleen, pancreas, or adrenal glands. No biliary ductal dilatation. Gallbladder is unremarkable  No lesions in the kidneys.  Renal collecting systems are not dilated

## (undated) NOTE — LETTER
Date & Time: 5/9/2021, 9:08 PM  Patient: Eula Spearing  Encounter Provider(s):    MD Fadia Rojo PA-C       To Whom It May Concern:    Suzan Cook was seen and treated in our department on 5/9/2021.   COVID-19 PCR testing per

## (undated) NOTE — LETTER
Date & Time: 5/18/2020, 5:10 PM  Patient: Zuleima Martinez  Encounter Provider(s):    Anjel Lucero MD       To Whom It May Concern:    Paul Farr was seen and treated in our department on 5/18/2020.  He will need to be on light or desk duty for the

## (undated) NOTE — ED AVS SNAPSHOT
Kj Rice   MRN: [id number]    Department:  [facility] Emergency Department   Date of Visit:  8/26/2019           Disclosure     Insurance plans vary and the physician(s) referred by the ER may not be covered by your plan.  Please contact yo tell this physician (or your personal doctor if your instructions are to return to your personal doctor) about any new or lasting problems. The primary care or specialist physician will see patients referred from the [facility] Emergency Department.  Salvadore Skiff

## (undated) NOTE — ED AVS SNAPSHOT
Shama Castellon   MRN: [id number]    Department:  [facility] in [place]   Date of Visit:  3/14/2020           Disclosure     Insurance plans vary and the physician(s) referred by the ER may not be covered by your plan.  Please conta tell this physician (or your personal doctor if your instructions are to return to your personal doctor) about any new or lasting problems. The primary care or specialist physician will see patients referred from the [facility] Emergency Department.  Faustino Black

## (undated) NOTE — ED AVS SNAPSHOT
[facility] Emergency Department    [place]  [address] [place] [address]    Phone:  [phone]    Fax:  [address] [facility]   MRN: [id number]    Department:  [facility] Emergency Department   Date of Visit:  5/1 IF THERE IS ANY CHANGE OR WORSENING OF YOUR CONDITION, CALL YOUR PRIMARY CARE PHYSICIAN AT ONCE OR RETURN IMMEDIATELY TO THE EMERGENCY DEPARTMENT.     If you have been prescribed any medication(s), please fill your prescription right away and begin taking t

## (undated) NOTE — LETTER
Date & Time: 1/3/2022, 8:16 AM  Patient: Paco Elisa  Encounter Provider(s):    Binh Rich MD       To Whom It May Concern:    Raz Smith was seen and treated in our department on 1/3/2022. He should not return to work until 1/08/2022.     I

## (undated) NOTE — ED AVS SNAPSHOT
[facility] Emergency Department    [place] [address]     [address] [place] [address]    Phone:  [phone]    Fax:  [phone]           Zuleima Martinez   MRN: [id number]    Department:  [facility] Emergency Department   Date of Visit:  3/2 and Class Registration line at [phone] or find a doctor online by visiting [email or web address].    IF THERE IS ANY CHANGE OR WORSENING OF YOUR CONDITION, CALL YOUR PRIMARY CARE PHYSICIAN AT ONCE OR RETURN IMMEDIATELY TO [address] [place] [address].     If

## (undated) NOTE — LETTER
Date & Time: 1/4/2019, 10:03 PM  Patient: Randy Bowens  Encounter Provider(s): Hetal Powell MD       To Whom It May Concern:    Corey Clayton was seen and treated in our department on 1/4/2019. He should not return to work until 1/7/19.     If

## (undated) NOTE — LETTER
Date & Time: 7/6/2018, 6:03 PM  Patient: Dean Stearns  Encounter Provider(s):    Tomasz Witt MD       To Whom It May Concern:    Vikas Buck was seen and treated in our department on 7/6/2018. He can return to work on 7/7/2018.     If you ha

## (undated) NOTE — ED AVS SNAPSHOT
Gigi Johansen   MRN: [id number]    Department:  THE [facility] [place] Emergency Department in [place]   Date of Visit:  1/4/2019           Disclosure     Insurance plans vary and the physician(s) referred by the ER may not be covered by your plan.  Please contac tell this physician (or your personal doctor if your instructions are to return to your personal doctor) about any new or lasting problems. The primary care or specialist physician will see patients referred from the [facility] Emergency Department.  Renaldo De Oliveira

## (undated) NOTE — LETTER
Date & Time: 5/10/2021, 11:02 PM  Patient: Dalia Morris  Encounter Provider(s):    Cynthia Mcqueen MD       To Whom It May Concern:    Gabriela Nickerson was seen and treated in our department on 5/10/2021.  He can return to work 5/12/21    If you have

## (undated) NOTE — LETTER
Date & Time: 7/13/2022, 1:21 AM  Patient: Yari Sequeira  Encounter Provider(s): Conner Fisher MD       To Whom It May Concern:    Loren Grande was seen and treated in our department on 7/13/2022. He may return to work on 7/14/2022.     If you have any questions or concerns, please do not hesitate to call.        _____________________________  Physician/APC Signature

## (undated) NOTE — ED AVS SNAPSHOT
Fiona Blank   MRN: [id number]    Department:  [facility] Emergency Department   Date of Visit:  7/6/2018           Disclosure     Insurance plans vary and the physician(s) referred by the ER may not be covered by your plan.  Please contact you tell this physician (or your personal doctor if your instructions are to return to your personal doctor) about any new or lasting problems. The primary care or specialist physician will see patients referred from the [facility] Emergency Department.  Renaldo De Oliveira

## (undated) NOTE — ED AVS SNAPSHOT
Vivianne Bloch   MRN: [id number]    Department:  THE [facility] Emergency Department in [place]   Date of Visit:  10/25/2019           Disclosure     Insurance plans vary and the physician(s) referred by the ER may not be covered by your plan.  Please cont tell this physician (or your personal doctor if your instructions are to return to your personal doctor) about any new or lasting problems. The primary care or specialist physician will see patients referred from the [facility] Emergency Department.  Van Atkins

## (undated) NOTE — ED AVS SNAPSHOT
Vinita Oakley   MRN: [id number]    Department:  [facility] Emergency Department in [place]   Date of Visit:  4/19/2019           Disclosure     Insurance plans vary and the physician(s) referred by the ER may not be covered by your plan.  Please conta tell this physician (or your personal doctor if your instructions are to return to your personal doctor) about any new or lasting problems. The primary care or specialist physician will see patients referred from the [facility] Emergency Department.  Aryan Harper

## (undated) NOTE — ED AVS SNAPSHOT
[facility] Emergency Department    [place] [address] [place] [address]    Phone:  [phone]    Fax:  [phone]           Randy Bowens   MRN: [id number]    Department:  [facility] Emergency Department   Date of Visit:  3/2 coverage and benefits available for follow-up care and referrals. If you have difficulty scheduling your follow-up appointment as directed, please call our  at [phone].      Si tiene problemas para programar jewels dwayne de seguimiento s different from what your Primary Care doctor has instructed you - please continue to take your medications as instructed by your Primary Care doctor until you can check with your doctor. Please bring the medication list to your next doctor's appointment. can help with your Affordable Care Act coverage, as well as all types of Medicaid plans. To get signed up and covered, please call [phone] and ask to get set up for an insurance coverage that is in-network with [facility]